# Patient Record
Sex: FEMALE | Race: BLACK OR AFRICAN AMERICAN | Employment: FULL TIME | URBAN - METROPOLITAN AREA
[De-identification: names, ages, dates, MRNs, and addresses within clinical notes are randomized per-mention and may not be internally consistent; named-entity substitution may affect disease eponyms.]

---

## 2017-05-12 ENCOUNTER — OFFICE VISIT (OUTPATIENT)
Dept: OBGYN CLINIC | Age: 32
End: 2017-05-12

## 2017-05-12 VITALS
DIASTOLIC BLOOD PRESSURE: 68 MMHG | SYSTOLIC BLOOD PRESSURE: 104 MMHG | HEIGHT: 62 IN | WEIGHT: 139.6 LBS | BODY MASS INDEX: 25.69 KG/M2 | RESPIRATION RATE: 18 BRPM

## 2017-05-12 DIAGNOSIS — Z97.5 IUD (INTRAUTERINE DEVICE) IN PLACE: ICD-10-CM

## 2017-05-12 DIAGNOSIS — N89.8 VAGINAL DISCHARGE: ICD-10-CM

## 2017-05-12 DIAGNOSIS — N89.8 VAGINAL ITCHING: ICD-10-CM

## 2017-05-12 DIAGNOSIS — Z01.411 ENCOUNTER FOR GYNECOLOGICAL EXAMINATION (GENERAL) (ROUTINE) WITH ABNORMAL FINDINGS: Primary | ICD-10-CM

## 2017-05-12 DIAGNOSIS — Z71.1 WORRIED WELL: ICD-10-CM

## 2017-05-12 LAB — WET MOUNT POCT, WMPOCT: NORMAL

## 2017-05-12 NOTE — PATIENT INSTRUCTIONS

## 2017-05-12 NOTE — PROGRESS NOTES
[facility]  [email or web address]  [phone]    Herminio Kawasaki, MD, [address] [facility]       Annual Gynecologic Exam:  WWE <40  Chief Complaint   Patient presents with    Well Woman         Deysi Dominguez is a 32 y.o.  [address] female who presents for an annual well woman exam.  Patient's last menstrual period was 2017 (within days). .    With regard to the Gardisil vaccine, she is older than the FDA approved age to receive it. She does report additional concerns today. Has concerns in regards to having recurrent BV and yeast infections since having the IUD. Patient states, \"My friends told me about some soaps you can use with the IUD to minimize infections and I want to try that for awhile before I decide to have it taken out completely. \" Expressed that last week she had some vaginal itching, discharge and some odor. Had some antibiotics at home, and took those. Reports also, using the Monistat one a day. Expressed that symptoms have improved within the past week. Has had 2-3 yeast infections/year. Has a few more BV/ year. Last week took 1 d monistat after taking leftover meds for BV. Usually is treated by [facility] for vaginal infections      Menstrual status:  Her periods are spotting. She does not report dysmenorrhea/painful menses. She does not report irregular bleeding. Sexual history and Contraception:  History   Sexual Activity    Sexual activity: Yes    Partners: Male    Birth control/ protection: IUD     She sometimes use condoms with sexual activity  She does not reports new sexual partner(s) in the last year. The patient does request STD testing. We recommended testing per CDC guidelines and at patient request.     Preventive Medicine History:  Her last annual GYN exam was about one year ago. Her most recent Pap smear result: normal was obtained in 2016.   Her most recent HR HPV screen was Negative obtained 1 year(s) ago.    She does not know have a history of ANTHONY 2, 3 or cervical cancer. Past Medical History:   Diagnosis Date    Abnormal Pap smear      1/15    Chlamydia     not since highschool 10th grade    Encounter for insertion of mirena IUD 2/23/15    removal in 5 years   Juan Pablo Souza Gonorrhea     last episode in highschool 10 th grade age 12 tx received    Pap smear for cervical cancer screening 16    Neg,HPV Neg    [facility]     OB History    Para Term  AB SAB TAB Ectopic Multiple Living   3 1  1 2 1 1 0 0 1      # Outcome Date GA Lbr Deny/2nd Weight Sex Delivery Anes PTL Lv   3 TAB            2             1 SAB                 History reviewed. No pertinent surgical history. Family History   Problem Relation Age of Onset    Headache Mother    Juan Pablo Souza Migraines Mother     Hypertension Mother     Arthritis-osteo Maternal Aunt     Alcohol abuse Maternal Grandfather     Cancer Paternal Grandmother     Breast Cancer Paternal Grandmother     Asthma Paternal Grandfather      Social History     Social History    Marital status: SINGLE     Spouse name: N/A    Number of children: N/A    Years of education: N/A     Occupational History    Not on file. Social History Main Topics    Smoking status: Never Smoker    Smokeless tobacco: Never Used    Alcohol use No    Drug use: No    Sexual activity: Yes     Partners: Male     Birth control/ protection: IUD     Other Topics Concern    Not on file     Social History Narrative       Allergies   Allergen Reactions    Latex, Natural Rubber Swelling       Current Outpatient Prescriptions   Medication Sig    levonorgestrel (MIRENA) 20 mcg/24 hr (5 years) IUD 1 Each by IntraUTERine route once. No current facility-administered medications for this visit. There is no problem list on file for this patient.       Review of Systems - History obtained from the patient  Constitutional: negative for weight loss, fever, night sweats  HEENT: negative for hearing loss, earache, congestion, snoring, sorethroat  CV: negative for chest pain, palpitations, edema  Resp: negative for cough, shortness of breath, wheezing  GI: negative for change in bowel habits, abdominal pain, black or bloody stools  : negative for frequency, dysuria, hematuria  GYN: see HPI  MSK: negative for back pain, joint pain, muscle pain  Breast: negative for breast lumps, nipple discharge, galactorrhea  Skin :negative for itching, rash, hives  Neuro: negative for dizziness, headache, confusion, weakness  Psych: negative for anxiety, depression, change in mood  Heme/lymph: negative for bleeding, bruising, pallor    Physical Exam  Visit Vitals    /68 (BP 1 Location: Left arm, BP Patient Position: Sitting)    Resp 18    Ht 5' 2\" (1.575 m)    Wt 139 lb 9.6 oz (63.3 kg)    LMP 05/12/2017 (Within Days)    BMI 25.53 kg/m2       Constitutional  · Appearance: well-nourished, well developed, alert, in no acute distress    HENT  · Head and Face: appears normal    Neck  · Inspection/Palpation: normal appearance, no masses or tenderness  · Lymph Nodes: no lymphadenopathy present  · Thyroid: gland size normal, nontender, no nodules or masses present on palpation    Chest  · Respiratory Effort: breathing labored  · Auscultation: normal breath sounds    Cardiovascular  · Heart:  · Auscultation: regular rate and rhythm without murmur    Breasts  · Inspection of Breasts: breasts symmetrical, no skin changes, no discharge present, nipple appearance normal, no skin retraction present  · Palpation of Breasts and Axillae: no masses present on palpation, no breast tenderness  · Axillary Lymph Nodes: no lymphadenopathy present    Gastrointestinal  · Abdominal Examination: abdomen non-tender to palpation, normal bowel sounds, no masses present  · Liver and spleen: no hepatomegaly present, spleen not palpable  · Hernias: no hernias identified    Genitourinary  · External Genitalia: normal appearance for age, no discharge present, no tenderness present, no inflammatory lesions present, no masses present  · Vagina: normal vaginal vault without central or paravaginal defects, blood tinged discharge present, no inflammatory lesions present, no masses present  · Bladder: non-tender to palpation  · Urethra: appears normal  · Cervix: normal   · IUD strings seen and appropriate length  ·   · Uterus: normal size, shape and consistency  · Adnexa: no adnexal tenderness present, no adnexal masses present  · Perineum: perineum within normal limits, no evidence of trauma, no rashes or skin lesions present  · Anus: anus within normal limits, no hemorrhoids present  · Inguinal Lymph Nodes: no lymphadenopathy present    Skin  · General Inspection: no rash, no lesions identified    Neurologic/Psychiatric  · Mental Status:  · Orientation: grossly oriented to person, place and time  · Mood and Affect: mood normal, affect appropriate    Assessment:  32 y.o.  for well woman exam  Encounter Diagnoses   Name Primary?  Vaginal discharge     Vaginal itching     Encounter for gynecological examination (general) (routine) with abnormal findings Yes    Worried well     IUD (intrauterine device) in place        Plan:  The patient was counseled about diet, exercise, healthy lifestyle  We discussed self breast exam  We discussed safer sex practices, condom use and risk factors for sexually transmitted diseases. We discussed current pap smear and HR HPV testing guidelines.    We recommend follow up one year for routine annual gynecologic exam or sooner prn  We recommend routine follow up with her primary care doctor for management of chronic medical problems and non-gynecologic concerns  Handouts were given to the patient  We discussed calcium/vitamin D/weight bearing exercise and osteoporosis prevention    Disc options for recurrent infections  Good vulvar hygiene  Obtain BV/yeast records if wants suppression  Pt wants to continue IUD    Folllow up:  [x] return for annual well woman exam in one year or sooner if she is having problems  [] follow up and ultrasound  [] 6 months  [] 3 months  [] 6 weeks   [] 1 month    Orders Placed This Encounter    HEP B SURFACE AG    RPR    HIV SCREEN, [address] [place] [address].  W/REFLEX CONFIRM    NUSWAB VAGINITIS PLUS    AMB POC WET PREP (AKA STAIN, INTERPRET, WET MOUNT)       Results for orders placed or performed in visit on 05/12/17   AMB POC SMEAR, STAIN & INTERPRET, WET MOUNT   Result Value Ref Range    Wet mount (POC)      Narrative    BRITTA    Hypae: negative  Buds: negative    Wet Prep:  Trich: negative  Clue cells: negative  Hyphae: negative  Buds: negative  WBC's: increased

## 2017-05-12 NOTE — MR AVS SNAPSHOT
Visit Information Date & Time Provider Department Dept. Phone Encounter #  
 5/12/2017  3:20 PM [facility] [id number] Upcoming Health Maintenance Date Due INFLUENZA AGE 9 TO ADULT 8/1/2017 PAP AKA CERVICAL CYTOLOGY 2/17/2019 Allergies as of 5/12/2017  Review Complete On: 5/12/2017 By: Keith Roberts Severity Noted Reaction Type Reactions Latex, Natural Rubber  05/12/2017    Swelling Current Immunizations  Reviewed on 8/25/2013 No immunizations on file. Not reviewed this visit Vitals BP Resp Height(growth percentile) Weight(growth percentile) LMP BMI  
 104/68 (BP 1 Location: Left arm, BP Patient Position: Sitting) 18 5' 2\" (1.575 m) 139 lb 9.6 oz (63.3 kg) 05/12/2017 (Within Days) 25.53 kg/m2 OB Status Smoking Status Chemically Induced Never Smoker BMI and BSA Data Body Mass Index Body Surface Area 25.53 kg/m 2 1.66 m 2 Preferred Pharmacy Pharmacy Name Phone [id number] [address] [facility] [phone] Your Updated Medication List  
  
   
This list is accurate as of: 5/12/17  3:41 PM.  Always use your most recent med list.  
  
  
  
  
 levonorgestrel 20 mcg/24 hr (5 years) IUD Commonly known as:  MIRENA  
1 Each by IntraUTERine route once. Patient Instructions Well Visit, Ages 25 to 48: Care Instructions Your Care Instructions Physical exams can help you stay healthy. Your doctor has checked your overall health and may have suggested ways to take good care of yourself. He or she also may have recommended tests. At home, you can help prevent illness with healthy eating, regular exercise, and other steps. Follow-up care is a key part of your treatment and safety.  Be sure to make and go to all appointments, and call your doctor if you are having problems. It's also a good idea to know your test results and keep a list of the medicines you take. How can you care for yourself at home? · Reach and stay at a healthy weight. This will lower your risk for many problems, such as obesity, diabetes, heart disease, and high blood pressure. · Get at least 30 minutes of physical activity on most days of the week. Walking is a good choice. You also may want to do other activities, such as running, swimming, cycling, or playing tennis or team sports. Discuss any changes in your exercise program with your doctor. · Do not smoke or allow others to smoke around you. If you need help quitting, talk to your doctor about stop-smoking programs and medicines. These can increase your chances of quitting for good. · Talk to your doctor about whether you have any risk factors for sexually transmitted infections (STIs). Having one sex partner (who does not have STIs and does not have sex with anyone else) is a good way to avoid these infections. · Use birth control if you do not want to have children at this time. Talk with your doctor about the choices available and what might be best for you. · Protect your skin from too much sun. When you're outdoors from 10 a.m. to 4 p.m., stay in the shade or cover up with clothing and a hat with a wide brim. Wear sunglasses that block UV rays. Even when it's cloudy, put broad-spectrum sunscreen (SPF 30 or higher) on any exposed skin. · See a dentist one or two times a year for checkups and to have your teeth cleaned. · Wear a seat belt in the car. · Drink alcohol in moderation, if at all. That means no more than 2 drinks a day for men and 1 drink a day for women. Follow your doctor's advice about when to have certain tests. These tests can spot problems early. For everyone · Cholesterol. Have the fat (cholesterol) in your blood tested after age 21.  Your doctor will tell you how often to have this done based on your age, family history, or other things that can increase your risk for heart disease. · Blood pressure. Have your blood pressure checked during a routine doctor visit. Your doctor will tell you how often to check your blood pressure based on your age, your blood pressure results, and other factors. · Vision. Talk with your doctor about how often to have a glaucoma test. 
· Diabetes. Ask your doctor whether you should have tests for diabetes. · Colon cancer. Have a test for colon cancer at age 48. You may have one of several tests. If you are younger than 48, you may need a test earlier if you have any risk factors. Risk factors include whether you already had a precancerous polyp removed from your colon or whether your parent, brother, sister, or child has had colon cancer. For women · Breast exam and mammogram. Talk to your doctor about when you should have a clinical breast exam and a mammogram. Medical experts differ on whether and how often women under 50 should have these tests. Your doctor can help you decide what is right for you. · Pap test and pelvic exam. Begin Pap tests at age 24. A Pap test is the best way to find cervical cancer. The test often is part of a pelvic exam. Ask how often to have this test. 
· Tests for sexually transmitted infections (STIs). Ask whether you should have tests for STIs. You may be at risk if you have sex with more than one person, especially if your partners do not wear condoms. For men · Tests for sexually transmitted infections (STIs). Ask whether you should have tests for STIs. You may be at risk if you have sex with more than one person, especially if you do not wear a condom. · Testicular cancer exam. Ask your doctor whether you should check your testicles regularly. · Prostate exam. Talk to your doctor about whether you should have a blood test (called a PSA test) for prostate cancer.  Experts differ on whether and when men should have this test. Some experts suggest it if you are older than 39 and are -American or have a father or brother who got prostate cancer when he was younger than 72. When should you call for help? Watch closely for changes in your health, and be sure to contact your doctor if you have any problems or symptoms that concern you. Where can you learn more? Go to http://john-kayla.info/. Enter P072 in the search box to learn more about \"Well Visit, Ages 25 to 48: Care Instructions. \" Current as of: July 19, 2016 Content Version: 11.2 © 2296-5185 [employer]. Care instructions adapted under license by [employer] (which disclaims liability or warranty for this information). If you have questions about a medical condition or this instruction, always ask your healthcare professional. [address] any warranty or liability for your use of this information. Introducing [facility]! Dear Tawnya Berger: 
Thank you for requesting a [employer] account. Our records indicate that you already have an active [employer] account. You can access your account anytime at [email or web address] Did you know that you can access your hospital and ER discharge instructions at any time in [employer]? You can also review all of your test results from your hospital stay or ER visit. Additional Information If you have questions, please visit the Frequently Asked Questions section of the [employer] website at [email or web address] Remember, [employer] is NOT to be used for urgent needs. For medical emergencies, dial 911. Now available from your iPhone and Android! Please provide this summary of care documentation to your next provider. Your primary care clinician is listed as NONE. If you have any questions after today's visit, please call [phone].

## 2017-05-13 LAB
HBV SURFACE AG SERPL QL IA: NEGATIVE
HIV 1+2 AB+HIV1 P24 AG SERPL QL IA: NON REACTIVE
RPR SER QL: NON REACTIVE

## 2017-05-15 NOTE — PROGRESS NOTES
The results are normal.   Please notify patient. Recommend f/u if still having symptoms/problems or has additional concerns.

## 2017-05-16 LAB
A VAGINAE DNA VAG QL NAA+PROBE: ABNORMAL SCORE
BVAB2 DNA VAG QL NAA+PROBE: ABNORMAL SCORE
C ALBICANS DNA VAG QL NAA+PROBE: NEGATIVE
C GLABRATA DNA VAG QL NAA+PROBE: NEGATIVE
C TRACH RRNA SPEC QL NAA+PROBE: NEGATIVE
MEGA1 DNA VAG QL NAA+PROBE: ABNORMAL SCORE
N GONORRHOEA RRNA SPEC QL NAA+PROBE: NEGATIVE
T VAGINALIS RRNA SPEC QL NAA+PROBE: NEGATIVE

## 2017-05-18 ENCOUNTER — TELEPHONE (OUTPATIENT)
Dept: OBGYN CLINIC | Age: 32
End: 2017-05-18

## 2017-05-18 NOTE — TELEPHONE ENCOUNTER
LMTCB    ----- Message from Charlene Alvarez MD sent at 5/17/2017  5:41 PM EDT -----  Abnormal, notify patient.   Consistent with imbalance but not clearly c/w BV: borderline results  Will treat b/c sx, or pt can observe if sx improved (discharge/odor)  Rx:   flagyl 500mg bid   x 7 days    May cause nausea, take with food  Avoid etoh during treatment and 1 day following  Notify MD if NI after 1 week    (If patient prefers vaginal tx't  0.75 %t metronidazole vaginal gel   5 grams qhs per vagina  x5 days)

## 2017-05-22 ENCOUNTER — TELEPHONE (OUTPATIENT)
Dept: OBGYN CLINIC | Age: 32
End: 2017-05-22

## 2017-05-22 RX ORDER — METRONIDAZOLE 7.5 MG/G
1 GEL VAGINAL DAILY
Qty: 1 TUBE | Refills: 0 | Status: SHIPPED | OUTPATIENT
Start: 2017-05-22 | End: 2017-05-27

## 2017-05-22 NOTE — TELEPHONE ENCOUNTER
Patient explained results of nuswab results and patient has elected to have the rx for metrogel sent to pharmacy of choice.

## 2017-05-22 NOTE — TELEPHONE ENCOUNTER
Notes Recorded by Jad Soria MD on 5/17/2017 at 5:41 PM  Abnormal, notify patient.   Consistent with imbalance but not clearly c/w BV: borderline results  Will treat b/c sx, or pt can observe if sx improved (discharge/odor)  Rx:   flagyl 500mg bid   x 7 days    May cause nausea, take with food  Avoid etoh during treatment and 1 day following  Notify MD if NI after 1 week    (If patient prefers vaginal tx't  0.75 %t metronidazole vaginal gel   5 grams qhs per vagina  x5 days)

## 2018-01-04 NOTE — PROGRESS NOTES
Abnormal, notify patient.   Consistent with imbalance but not clearly c/w BV: borderline results  Will treat b/c sx, or pt can observe if sx improved (discharge/odor)  Rx:   flagyl 500mg bid   x 7 days    May cause nausea, take with food  Avoid etoh during treatment and 1 day following  Notify MD if NI after 1 week    (If patient prefers vaginal tx't  0.75 %t metronidazole vaginal gel   5 grams qhs per vagina  x5 days) Yes

## 2018-02-26 ENCOUNTER — OFFICE VISIT (OUTPATIENT)
Dept: OBGYN CLINIC | Age: 33
End: 2018-02-26

## 2018-02-26 VITALS — BODY MASS INDEX: 23.96 KG/M2 | SYSTOLIC BLOOD PRESSURE: 110 MMHG | DIASTOLIC BLOOD PRESSURE: 70 MMHG | WEIGHT: 131 LBS

## 2018-02-26 DIAGNOSIS — N60.11 FIBROCYSTIC BREAST CHANGES, RIGHT: ICD-10-CM

## 2018-02-26 DIAGNOSIS — Z80.3 FH: BREAST CANCER: ICD-10-CM

## 2018-02-26 DIAGNOSIS — N64.4 BREAST PAIN, RIGHT: Primary | ICD-10-CM

## 2018-02-26 RX ORDER — HYDROCODONE BITARTRATE AND ACETAMINOPHEN 5; 325 MG/1; MG/1
TABLET ORAL
COMMUNITY
End: 2018-04-09 | Stop reason: ALTCHOICE

## 2018-02-26 RX ORDER — PENICILLIN V POTASSIUM 250 MG/1
TABLET, FILM COATED ORAL 4 TIMES DAILY
COMMUNITY
End: 2020-05-27

## 2018-02-26 NOTE — PATIENT INSTRUCTIONS
Fibrocystic Breast Changes: Care Instructions  Your Care Instructions  Fibrocystic breast changes cause many small lumps to form in your breast. Some areas of your breast may feel thicker or denser than other areas. Your breasts also may feel sore or tender. You may notice lumps in both breasts around the nipple and in the upper, outer part of the breasts, especially before your menstrual period. The lumps may come and go and change size in just a few days. Fibrocystic breast changes are normal and are not cancer. Treatment is not usually needed. If you have a hard, grainy lump, unusual pain, or nipple discharge, your doctor may order tests to look for a more serious problem. Talk to your doctor about the need for regular mammograms. Follow-up care is a key part of your treatment and safety. Be sure to make and go to all appointments, and call your doctor if you are having problems. It's also a good idea to know your test results and keep a list of the medicines you take. How can you care for yourself at home? · Take an over-the-counter pain medicine, such as acetaminophen (Tylenol), ibuprofen (Advil, Motrin), or naproxen (Aleve). Read and follow all instructions on the label. · Do not take two or more pain medicines at the same time unless the doctor told you to. Many pain medicines have acetaminophen, which is Tylenol. Too much acetaminophen (Tylenol) can be harmful. · Wear a supportive bra, such as a sports bra or jog bra. · You may want to limit caffeine. Some women say that cutting back on caffeine reduces breast tenderness. · A diet very low in fat (about 15% of daily diet) may reduce breast tenderness. Talk to your doctor about whether you should try a very low-fat diet. When should you call for help? Watch closely for changes in your health, and be sure to contact your doctor if:  · You do not get better as expected. · Your breast has changed.   · You have pain in your breast.  · You have a discharge from your nipple. · A breast lump changes or does not go away. Where can you learn more? Go to http://john-kayla.info/. Enter F300 in the search box to learn more about \"Fibrocystic Breast Changes: Care Instructions. \"  Current as of: October 13, 2016  Content Version: 11.4  © 6273-9434 [employer]. Care instructions adapted under license by [employer] (which disclaims liability or warranty for this information). If you have questions about a medical condition or this instruction, always ask your healthcare professional. [place] [address] any warranty or liability for your use of this information.

## 2018-02-26 NOTE — PROGRESS NOTES
[address] [facility]  [email or web address]    Monisha Hdez MD, FACOG       OB/GYN Problem visit    Chief Complaint:   Chief Complaint   Patient presents with    Breast Problem       History of Present Illness: This is a new problem being evaluated by this provider. The patient is a 28 y.o.  female who reports having right breast pain for 3 weeks. Denies nipple discharge. She reports the symptoms are is unchanged. Aggravating factors include cycle. Alleviating factors include none. Removed nipple piercing 1 year ago due to pain. Describes right breast pain as feeling engorged, right breast is warm to touch. Recent dental surgery - taking hydrocodone and penicillin. +nipple piercing: remove a few months ago, received 1 1/2 years ago. Just ended cycle. No increase in caffeine. No trauma. She reports no prior history of breast cancer, biopsy or abnormal mammograms. She is not breast feeding. She does not have other concerns. Last Mammogram Results:  No results found for this or any previous visit. LMP: No LMP recorded. Patient is not currently having periods (Reason: Chemically Induced). PFSH:  Past Medical History:   Diagnosis Date    Abnormal Pap smear      1/15    Chlamydia     not since [facility] 10th grade    Encounter for insertion of mirena IUD 2/23/15    removal in 5 years   [facility] Gonorrhea     last episode in [employer] [facility] 10 th grade age 12 tx received    Pap smear for cervical cancer screening 16    Neg,HPV Neg    Psychiatric problem     depression in [facility]     No past surgical history on file.   Family History   Problem Relation Age of Onset    Headache Mother    [facility] Migraines Mother     Hypertension Mother     Arthritis-osteo Maternal Aunt     Alcohol abuse Maternal Grandfather     Cancer Paternal Grandmother     Breast Cancer Paternal Grandmother     Asthma Paternal Grandfather      Social History   Substance Use Topics    Smoking status: Never Smoker    Smokeless tobacco: Never Used    Alcohol use No     Allergies   Allergen Reactions    Latex, Natural Rubber Swelling     Current Outpatient Prescriptions   Medication Sig    penicillin v potassium (VEETID) 250 mg tablet Take  by mouth four (4) times daily.  HYDROcodone-acetaminophen (NORCO) 5-325 mg per tablet Take  by mouth.  levonorgestrel (MIRENA) 20 mcg/24 hr (5 years) IUD 1 Each by IntraUTERine route once. No current facility-administered medications for this visit. Review of Systems:  History obtained from the patient  Constitutional: negative for fevers, chills and weight loss  ENT ROS: negative for - hearing change, oral lesions or visual changes  Respiratory: negative for cough, wheezing or dyspnea on exertion  Cardiovascular: negative for chest pain, irregular heart beats, exertional chest pressure/discomfort  Gastrointestinal: negative for dysphagia, nausea and vomiting  Genito-Urinary ROS: no dysuria, trouble voiding, or hematuria  Inteument/breast: see HPI  Musculoskeletal:negative for stiff joints, neck pain and muscle weakness  Endocrine ROS: negative for - breast changes, galactorrhea or temperature intolerance  Hematological and Lymphatic ROS: negative for - blood clots, bruising or swollen lymph nodes    Physical Exam:  Visit Vitals    /70    Wt 131 lb (59.4 kg)    BMI 23.96 kg/m2       GENERAL: alert, well appearing, and in no distress  HEAD: normocephalic, atraumatic. NECK:  supple, no significant adenopathy, no palpable masses  PULM: clear to auscultation, no wheezes, rales or rhonchi, symmetric air entry   COR: normal rate and regular rhythm, S1 and S2 normal   BACK: no cvat  ABDOMEN: soft, nontender, nondistended, no masses or organomegaly   BREAST:   Right breast appear normal, no suspicious masses; more prominent fibrocystic changes on right, no skin or nipple changes or axillary nodes.   Left breast appear normal, no suspicious masses, no skin or nipple changes or axillary nodes  NEURO: alert, oriented, normal speech  SKIN: no breast skin changes    Assessment:  Encounter Diagnoses   Name Primary?  Breast pain, right Yes    Fibrocystic breast changes, right     FH: breast cancer        Plan:  The patient is advised that she should contact the office if she does not note improvement or if symptoms recur. She should contact our office with any questions or concerns. She could keep her routine annual exam appointment. We reviewed self breast exams with the patient. We recommend follow up with PCP for non-gynecologic complaints and chronic medical problems.     Breast referral  Disc sbe  Disc breast cancer screening options  Disc options including NSAIDs and observation, pt prefers breast referral/addl evaluation b/c FH    Orders Placed This Encounter    REFERRAL TO BREAST SURGERY

## 2018-02-26 NOTE — MR AVS SNAPSHOT
[address] [facility] [address] [id number] Patient: Scott Posada MRN: [id number] :1985 Visit Information Date & Time Provider Department Dept. Phone Encounter #  
 2018 11:00 AM Adriel Castaneda MD Ladarius Rodriguez [phone] [id number] Upcoming Health Maintenance Date Due Influenza Age 5 to Adult 2017 PAP AKA CERVICAL CYTOLOGY 2019 Allergies as of 2018  Review Complete On: 2018 By: Adriel Castaneda MD  
  
 Severity Noted Reaction Type Reactions Latex, Natural Rubber  2017    Swelling Current Immunizations  Reviewed on 2013 No immunizations on file. Not reviewed this visit You Were Diagnosed With   
  
 Codes Comments Breast pain, right    -  Primary ICD-10-CM: N64.4 ICD-9-CM: 611.71 Fibrocystic breast changes, right     ICD-10-CM: N60.11 ICD-9-CM: 610.1 FH: breast cancer     ICD-10-CM: Z80.3 ICD-9-CM: V16.3 Vitals BP Weight(growth percentile) BMI OB Status Smoking Status 110/70 131 lb (59.4 kg) 23.96 kg/m2 Chemically Induced Never Smoker BMI and BSA Data Body Mass Index Body Surface Area  
 23.96 kg/m 2 1.61 m 2 Preferred Pharmacy Pharmacy Name Phone [id number] [place], [address] [phone] Your Updated Medication List  
  
   
This list is accurate as of 18 11:28 AM.  Always use your most recent med list.  
  
  
  
  
 HYDROcodone-acetaminophen 5-325 mg per tablet Commonly known as:  [place] Take  by mouth.  
  
 levonorgestrel 20 mcg/24 hr (5 years) Iud  
Commonly known as:  MIRENA  
1 Each by IntraUTERine route once. penicillin v potassium 250 mg tablet Commonly known as:  VEETID Take  by mouth four (4) times daily. Patient Instructions Fibrocystic Breast Changes: Care Instructions Your Care Instructions Fibrocystic breast changes cause many small lumps to form in your breast. Some areas of your breast may feel thicker or denser than other areas. Your breasts also may feel sore or tender. You may notice lumps in both breasts around the nipple and in the upper, outer part of the breasts, especially before your menstrual period. The lumps may come and go and change size in just a few days. Fibrocystic breast changes are normal and are not cancer. Treatment is not usually needed. If you have a hard, grainy lump, unusual pain, or nipple discharge, your doctor may order tests to look for a more serious problem. Talk to your doctor about the need for regular mammograms. Follow-up care is a key part of your treatment and safety. Be sure to make and go to all appointments, and call your doctor if you are having problems. It's also a good idea to know your test results and keep a list of the medicines you take. How can you care for yourself at home? · Take an over-the-counter pain medicine, such as acetaminophen (Tylenol), ibuprofen (Advil, Motrin), or naproxen (Aleve). Read and follow all instructions on the label. · Do not take two or more pain medicines at the same time unless the doctor told you to. Many pain medicines have acetaminophen, which is Tylenol. Too much acetaminophen (Tylenol) can be harmful. · Wear a supportive bra, such as a sports bra or jog bra. · You may want to limit caffeine. Some women say that cutting back on caffeine reduces breast tenderness. · A diet very low in fat (about 15% of daily diet) may reduce breast tenderness. Talk to your doctor about whether you should try a very low-fat diet. When should you call for help? Watch closely for changes in your health, and be sure to contact your doctor if: 
· You do not get better as expected. · Your breast has changed. · You have pain in your breast. 
· You have a discharge from your nipple. · A breast lump changes or does not go away. Where can you learn more? Go to http://john-kayla.info/. Enter C060 in the search box to learn more about \"Fibrocystic Breast Changes: Care Instructions. \" Current as of: October 13, 2016 Content Version: 11.4 © 0424-0478 [employer]. Care instructions adapted under license by [employer] (which disclaims liability or warranty for this information). If you have questions about a medical condition or this instruction, always ask your healthcare professional. [address] any warranty or liability for your use of this information. Introducing [facility]! Dear Corina Jaquez: 
Thank you for requesting a Proxim[employer] account. Our records indicate that you already have an active Proxim[employer] account. You can access your account anytime at [email or web address] Pay with a [email or web address] Did you know that you can access your hospital and ER discharge instructions at any time in Proximagen? You can also review all of your test results from your hospital stay or ER visit. Additional Information If you have questions, please visit the Frequently Asked Questions section of the Proximagen website at [email or web address] Pay with a [email or web address] Remember, Proximagen is NOT to be used for urgent needs. For medical emergencies, dial 911. Now available from your iPhone and Android! Please provide this summary of care documentation to your next provider. Your primary care clinician is listed as NONE. If you have any questions after today's visit, please call [phone].

## 2018-04-09 ENCOUNTER — OFFICE VISIT (OUTPATIENT)
Dept: SURGERY | Age: 33
End: 2018-04-09

## 2018-04-09 VITALS
SYSTOLIC BLOOD PRESSURE: 121 MMHG | WEIGHT: 130 LBS | HEIGHT: 62 IN | HEART RATE: 92 BPM | BODY MASS INDEX: 23.92 KG/M2 | DIASTOLIC BLOOD PRESSURE: 51 MMHG

## 2018-04-09 DIAGNOSIS — N64.4 MASTODYNIA OF RIGHT BREAST: ICD-10-CM

## 2018-04-09 DIAGNOSIS — N63.10 BREAST MASS, RIGHT: Primary | ICD-10-CM

## 2018-04-09 NOTE — PROGRESS NOTES
HISTORY OF PRESENT ILLNESS  Andreea Gonzalez is a 28 y.o. female. HPI NEW Patient presents for consultation at the request of Dr. Blair Crowder for RIGHT breast \"pressure\" and lump. Describes pressure as \"on and off\" since February. Went to see Dayne Crowder who palpated \"pea-sized lump. \"     FH includes paternal grandmother who was diagnosed with breast cancer in her 30-40's. Still living. The patient has never had breast imaging done before. Past Medical History:   Diagnosis Date    Abnormal Pap smear      1/15    Chlamydia     not since highschool 10th grade    Encounter for insertion of mirena IUD 2/23/15    removal in 5 years   [address] [place] [address] Gonorrhea     last episode in [facility] 10 th grade age 12 tx received    Pap smear for cervical cancer screening 16    Neg,HPV Neg    [facility]     History reviewed. No pertinent surgical history. Social History     Social History    Marital status: SINGLE     Spouse name: N/A    Number of children: N/A    Years of education: N/A     Occupational History    Not on file. Social History Main Topics    Smoking status: Former Smoker    Smokeless tobacco: Never Used    Alcohol use 0.6 oz/week     1 Standard drinks or equivalent per week    Drug use: No    Sexual activity: Yes     Partners: Male     Birth control/ protection: IUD     Other Topics Concern    Not on file     Social History Narrative     OB History      Para Term  AB Living    3 1  1 2 1    SAB TAB Ectopic Molar Multiple Live Births    1 1 0  0         Obstetric Comments    Menarche:  8. LMP: 3/26/18. # of Children:  2. Age at Delivery of First Child:  21.   Hysterectomy/oophorectomy:  NO/NO. Breast Bx:  no.  Hx of Breast Feeding:  yes. BCP:  yes.  Hormone therapy:  no.               Current Outpatient Prescriptions:     levonorgestrel (MIRENA) 20 mcg/24 hr (5 years) IUD, 1 Each by IntraUTERine route once., Disp: , Rfl:    penicillin v potassium (VEETID) 250 mg tablet, Take  by mouth four (4) times daily. , Disp: , Rfl:   Allergies   Allergen Reactions    Latex, Natural Rubber Swelling         Review of Systems   Constitutional: Negative. HENT: Negative. Eyes: Negative. Respiratory: Negative. Cardiovascular: Negative. Gastrointestinal: Negative. Genitourinary: Negative. Musculoskeletal: Negative. Skin: Negative. Neurological: Negative. Endo/Heme/Allergies: Negative. Psychiatric/Behavioral: Negative. Physical Exam   Constitutional: She is oriented to person, place, and time. She appears well-developed and well-nourished. HENT:   Head: Normocephalic. Eyes: EOM are normal.   Neck: Neck supple. Cardiovascular: Intact distal pulses. Pulmonary/Chest: Effort normal.   Bilateral dense breast tissue at 12:00 bilateral breasts, right greater than left. Abdominal: Soft. Musculoskeletal: Normal range of motion. Neurological: She is alert and oriented to person, place, and time. Skin: Skin is warm and dry. Psychiatric: She has a normal mood and affect. Nursing note and vitals reviewed. ASSESSMENT and PLAN    ICD-10-CM ICD-9-CM    1. Breast mass, right N63.10 611.72    2. Mastodynia of right breast N64.4 611.71      - will order dx mammograms and right us. - will know plan once we have imaging.

## 2018-04-09 NOTE — PATIENT INSTRUCTIONS
Breast Self-Exam: Care Instructions  Your Care Instructions    A breast self-exam is when you check your breasts for lumps or changes. This regular exam helps you learn how your breasts normally look and feel. Most breast problems or changes are not because of cancer. Breast self-exam is not a substitute for a mammogram. Having regular breast exams by your doctor and regular mammograms improve your chances of finding any problems with your breasts. Some women set a time each month to do a step-by-step breast self-exam. Other women like a less formal system. They might look at their breasts as they brush their teeth, or feel their breasts once in a while in the shower. If you notice a change in your breast, tell your doctor. Follow-up care is a key part of your treatment and safety. Be sure to make and go to all appointments, and call your doctor if you are having problems. It's also a good idea to know your test results and keep a list of the medicines you take. How do you do a breast self-exam?  · The best time to examine your breasts is usually one week after your menstrual period begins. Your breasts should not be tender then. If you do not have periods, you might do your exam on a day of the month that is easy to remember. · To examine your breasts:  ¨ Remove all your clothes above the waist and lie down. When you are lying down, your breast tissue spreads evenly over your chest wall, which makes it easier to feel all your breast tissue. ¨ Use the pads-not the fingertips-of the 3 middle fingers of your left hand to check your right breast. Move your fingers slowly in small coin-sized circles that overlap. ¨ Use three levels of pressure to feel of all your breast tissue. Use light pressure to feel the tissue close to the skin surface. Use medium pressure to feel a little deeper. Use firm pressure to feel your tissue close to your breastbone and ribs.  Use each pressure level to feel your breast tissue before moving on to the next spot. ¨ Check your entire breast, moving up and down as if following a strip from the collarbone to the bra line, and from the armpit to the ribs. Repeat until you have covered the entire breast.  ¨ Repeat this procedure for your left breast, using the pads of the 3 middle fingers of your right hand. · To examine your breasts while in the shower:  ¨ Place one arm over your head and lightly soap your breast on that side. ¨ Using the pads of your fingers, gently move your hand over your breast (in the strip pattern described above), feeling carefully for any lumps or changes. ¨ Repeat for the other breast.  · Have your doctor inspect anything you notice to see if you need further testing. Where can you learn more? Go to http://john-kayla.info/. Enter P148 in the search box to learn more about \"Breast Self-Exam: Care Instructions. \"  Current as of: May 12, 2017  Content Version: 11.4  © 7728-8789 [employer]. Care instructions adapted under license by [employer] (which disclaims liability or warranty for this information). If you have questions about a medical condition or this instruction, always ask your healthcare professional. [place] [address] any warranty or liability for your use of this information.

## 2018-04-10 PROBLEM — N63.10 BREAST MASS, RIGHT: Status: ACTIVE | Noted: 2018-04-10

## 2018-04-10 PROBLEM — N64.4 MASTODYNIA OF RIGHT BREAST: Status: ACTIVE | Noted: 2018-04-10

## 2018-04-18 ENCOUNTER — HOSPITAL ENCOUNTER (OUTPATIENT)
Dept: MAMMOGRAPHY | Age: 33
Discharge: HOME | End: 2018-04-18
Attending: SURGERY
Payer: COMMERCIAL

## 2018-04-18 DIAGNOSIS — N63.0 BREAST LUMP: ICD-10-CM

## 2018-04-18 PROCEDURE — 76642 ULTRASOUND BREAST LIMITED: CPT

## 2018-04-18 PROCEDURE — 77066 DX MAMMO INCL CAD BI: CPT

## 2018-08-01 ENCOUNTER — OFFICE VISIT (OUTPATIENT)
Dept: OBGYN CLINIC | Age: 33
End: 2018-08-01

## 2018-08-01 VITALS
WEIGHT: 134 LBS | SYSTOLIC BLOOD PRESSURE: 118 MMHG | DIASTOLIC BLOOD PRESSURE: 72 MMHG | HEIGHT: 62 IN | BODY MASS INDEX: 24.66 KG/M2

## 2018-08-01 DIAGNOSIS — N89.8 VAGINAL DISCHARGE: ICD-10-CM

## 2018-08-01 DIAGNOSIS — N89.8 VAGINAL ODOR: ICD-10-CM

## 2018-08-01 DIAGNOSIS — Z97.5 IUD (INTRAUTERINE DEVICE) IN PLACE: ICD-10-CM

## 2018-08-01 DIAGNOSIS — Z71.1 WORRIED WELL: ICD-10-CM

## 2018-08-01 DIAGNOSIS — Z01.411 ENCOUNTER FOR GYNECOLOGICAL EXAMINATION (GENERAL) (ROUTINE) WITH ABNORMAL FINDINGS: Primary | ICD-10-CM

## 2018-08-01 LAB — WET MOUNT POCT, WMPOCT: NORMAL

## 2018-08-01 NOTE — MR AVS SNAPSHOT
[address] [facility] [address] [facility] 
[phone] Patient: Lulla Lennox MRN: [id number] :[id number] Visit Information Date & Time Provider Department Dept. Phone Encounter #  
 2018  4:00 PM Lupillo Underwood MD Ladarius Rodriguez [phone] [id number] Upcoming Health Maintenance Date Due Influenza Age 5 to Adult 2018 PAP AKA CERVICAL CYTOLOGY 2019 Allergies as of 2018  Review Complete On: 2018 By: Miguelina Escobar LPN Severity Noted Reaction Type Reactions Latex, Natural Rubber  2017    Swelling Current Immunizations  Reviewed on 2013 No immunizations on file. Not reviewed this visit Vitals BP Height(growth percentile) Weight(growth percentile) LMP BMI OB Status 118/72 5' 2\" (1.575 m) 134 lb (60.8 kg) 2018 24.51 kg/m2 Having regular periods Smoking Status Former Smoker BMI and BSA Data Body Mass Index Body Surface Area 24.51 kg/m 2 1.63 m 2 Preferred Pharmacy Pharmacy Name Phone [id number], [address] [facility] [phone] Your Updated Medication List  
  
   
This list is accurate as of 18  4:03 PM.  Always use your most recent med list.  
  
  
  
  
 levonorgestrel 20 mcg/24 hr (5 years) Iud  
Commonly known as:  MIRENA  
1 Each by IntraUTERine route once. penicillin v potassium 250 mg tablet Commonly known as:  VEETID Take  by mouth four (4) times daily. Patient Instructions Well Visit, Ages 25 to 48: Care Instructions Your Care Instructions Physical exams can help you stay healthy. Your doctor has checked your overall health and may have suggested ways to take good care of yourself. He or she also may have recommended tests.  At home, you can help prevent illness with healthy eating, regular exercise, and other steps. Follow-up care is a key part of your treatment and safety. Be sure to make and go to all appointments, and call your doctor if you are having problems. It's also a good idea to know your test results and keep a list of the medicines you take. How can you care for yourself at home? · Reach and stay at a healthy weight. This will lower your risk for many problems, such as obesity, diabetes, heart disease, and high blood pressure. · Get at least 30 minutes of physical activity on most days of the week. Walking is a good choice. You also may want to do other activities, such as running, swimming, cycling, or playing tennis or team sports. Discuss any changes in your exercise program with your doctor. · Do not smoke or allow others to smoke around you. If you need help quitting, talk to your doctor about stop-smoking programs and medicines. These can increase your chances of quitting for good. · Talk to your doctor about whether you have any risk factors for sexually transmitted infections (STIs). Having one sex partner (who does not have STIs and does not have sex with anyone else) is a good way to avoid these infections. · Use birth control if you do not want to have children at this time. Talk with your doctor about the choices available and what might be best for you. · Protect your skin from too much sun. When you're outdoors from 10 a.m. to 4 p.m., stay in the shade or cover up with clothing and a hat with a wide brim. Wear sunglasses that block UV rays. Even when it's cloudy, put broad-spectrum sunscreen (SPF 30 or higher) on any exposed skin. · See a dentist one or two times a year for checkups and to have your teeth cleaned. · Wear a seat belt in the car. · Drink alcohol in moderation, if at all. That means no more than 2 drinks a day for men and 1 drink a day for women. Follow your doctor's advice about when to have certain tests. These tests can spot problems early. For everyone · Cholesterol. Have the fat (cholesterol) in your blood tested after age 21. Your doctor will tell you how often to have this done based on your age, family history, or other things that can increase your risk for heart disease. · Blood pressure. Have your blood pressure checked during a routine doctor visit. Your doctor will tell you how often to check your blood pressure based on your age, your blood pressure results, and other factors. · Vision. Talk with your doctor about how often to have a glaucoma test. 
· Diabetes. Ask your doctor whether you should have tests for diabetes. · Colon cancer. Have a test for colon cancer at age 48. You may have one of several tests. If you are younger than 48, you may need a test earlier if you have any risk factors. Risk factors include whether you already had a precancerous polyp removed from your colon or whether your parent, brother, sister, or child has had colon cancer. For women · Breast exam and mammogram. Talk to your doctor about when you should have a clinical breast exam and a mammogram. Medical experts differ on whether and how often women under 50 should have these tests. Your doctor can help you decide what is right for you. · Pap test and pelvic exam. Begin Pap tests at age 24. A Pap test is the best way to find cervical cancer. The test often is part of a pelvic exam. Ask how often to have this test. 
· Tests for sexually transmitted infections (STIs). Ask whether you should have tests for STIs. You may be at risk if you have sex with more than one person, especially if your partners do not wear condoms. For men · Tests for sexually transmitted infections (STIs). Ask whether you should have tests for STIs. You may be at risk if you have sex with more than one person, especially if you do not wear a condom. · Testicular cancer exam. Ask your doctor whether you should check your testicles regularly. · Prostate exam. Talk to your doctor about whether you should have a blood test (called a PSA test) for prostate cancer. Experts differ on whether and when men should have this test. Some experts suggest it if you are older than 39 and are -American or have a father or brother who got prostate cancer when he was younger than 72. When should you call for help? Watch closely for changes in your health, and be sure to contact your doctor if you have any problems or symptoms that concern you. Where can you learn more? Go to http://john-kayla.info/. Enter P072 in the search box to learn more about \"Well Visit, Ages 25 to 48: Care Instructions. \" Current as of: May 16, 2017 Content Version: 11.7 © 0494-1745 [employer]. Care instructions adapted under license by [employer] (which disclaims liability or warranty for this information). If you have questions about a medical condition or this instruction, always ask your healthcare professional. [place] [address] any warranty or liability for your use of this information. Introducing \[facility]\"" & HEALTH SERVICES! [employer] [place]: 
Thank you for requesting a [employer] account. Our records indicate that you already have an active [employer] account. You can access your account anytime at [email or web address] Did you know that you can access your hospital and ER discharge instructions at any time in [employer]? You can also review all of your test results from your hospital stay or ER visit. Additional Information If you have questions, please visit the Frequently Asked Questions section of the [employer] website at [email or web address] Remember, [employer] is NOT to be used for urgent needs. For medical emergencies, dial 911. Now available from your iPhone and Android! Please provide this summary of care documentation to your next provider. Your primary care clinician is listed as NONE. If you have any questions after today's visit, please call [phone].

## 2018-08-01 NOTE — PATIENT INSTRUCTIONS

## 2018-08-01 NOTE — PROGRESS NOTES
[address] [facility] 
[email or web address] 
[phone] Lexy Ag MD, Isidro Wade Annual Gynecologic Exam: 
WWE <40 Chief Complaint Patient presents with  Well Woman  Vaginal Discharge  Other  
  vag odor Dez Valdes is a 28 y.o.  [address] female who presents for an annual well woman exam. 
Patient's last menstrual period was 2018. Caity Jewel With regard to the Gardisil vaccine, she is older than the FDA approved age to receive it. She does report additional concerns today. Pt would like to discuss possibly getting her IUD removed due to 2 yeast infections in the past 3 months. Used 1d monistat and sx returned. Sx better but then had menses. +musty smell x 1-2 days. Menstrual status: 
Her periods are moderate. She does not report dysmenorrhea/painful menses. She does not report irregular bleeding. Sexual history and Contraception: 
History Sexual Activity  Sexual activity: Yes  Partners: Male  Birth control/ protection: IUD She never use condoms with sexual activity She does not reports new sexual partner(s) in the last year. The patient does not request STD testing. We recommended testing per CDC guidelines and at patient request.  
 
Preventive Medicine History: 
Her most recent Pap smear result: normal was obtained in 2016 Her most recent HR HPV screen was Negative obtained in 2016 She does not have a history of ANTHONY 2, 3 or cervical cancer. Past Medical History:  
Diagnosis Date  Abnormal Pap smear   1/15  Chlamydia   
 not since [facility] 10th grade  Encounter for insertion of mirena IUD 2/23/15  
 removal in 5 years  Gonorrhea   
 last episode in [facility] 8 th grade age 12 tx received  Pap smear for cervical cancer screening 16 Neg,HPV Neg  
 Psychiatric problem   
 depression in [facility] OB History  Para Term  AB Living 3 1  1 2 1 SAB TAB Ectopic Molar Multiple Live Births 1 1 0  0 # Outcome Date GA Lbr Deny/2nd Weight Sex Delivery Anes PTL Lv  
3 TAB           
2  1 SAB Obstetric Comments Menarche:  10. LMP: 3/26/18. # of Children:  2. Age at Delivery of First Child:  21.   Hysterectomy/oophorectomy:  NO/NO. Breast Bx:  no.  Hx of Breast Feeding:  yes. BCP:  yes. Hormone therapy:  no.   
  
 
No past surgical history on file. Family History Problem Relation Age of Onset  Headache Mother  Migraines Mother  Hypertension Mother  Arthritis-osteo Maternal Aunt  Alcohol abuse Maternal Grandfather  Cancer Paternal Grandmother  Breast Cancer Paternal Grandmother 30-40  Asthma Paternal Grandfather Social History Social History  Marital status: SINGLE Spouse name: N/A  
 Number of children: N/A  
 Years of education: N/A Occupational History  Not on file. Social History Main Topics  Smoking status: Former Smoker  Smokeless tobacco: Never Used  Alcohol use 0.6 oz/week 1 Standard drinks or equivalent per week  Drug use: No  
 Sexual activity: Yes  
  Partners: Male Birth control/ protection: IUD Other Topics Concern  Not on file Social History Narrative Allergies Allergen Reactions  Latex, Natural Rubber Swelling Current Outpatient Prescriptions Medication Sig  levonorgestrel (MIRENA) 20 mcg/24 hr (5 years) IUD 1 Each by IntraUTERine route once.  penicillin v potassium (VEETID) 250 mg tablet Take  by mouth four (4) times daily. No current facility-administered medications for this visit. Patient Active Problem List  
Diagnosis Code  Breast mass, right N63.10  Mastodynia of right breast N64.4 Review of Systems - History obtained from the patient Constitutional: negative for weight loss, fever, night sweats HEENT: negative for hearing loss, earache, congestion, snoring, sorethroat CV: negative for chest pain, palpitations, edema Resp: negative for cough, shortness of breath, wheezing GI: negative for change in bowel habits, abdominal pain, black or bloody stools : negative for frequency, dysuria, hematuria GYN: see HPI 
MSK: negative for back pain, joint pain, muscle pain Breast: negative for breast lumps, nipple discharge, galactorrhea Skin :negative for itching, rash, hives Neuro: negative for dizziness, headache, confusion, weakness Psych: negative for anxiety, depression, change in mood Heme/lymph: negative for bleeding, bruising, pallor Physical Exam 
Visit Vitals  /72  Ht 5' 2\" (1.575 m)  Wt 134 lb (60.8 kg)  LMP 07/28/2018  BMI 24.51 kg/m2 Constitutional 
· Appearance: well-nourished, well developed, alert, in no acute distress HENT 
· Head and Face: appears normal 
 
Neck · Inspection/Palpation: normal appearance, no masses or tenderness · Lymph Nodes: no lymphadenopathy present · Thyroid: gland size normal, nontender, no nodules or masses present on palpation Chest 
· Respiratory Effort: breathing unlabored · Auscultation: normal breath sounds Cardiovascular · Heart: 
· Auscultation: regular rate and rhythm without murmur Breasts · Inspection of Breasts: breasts symmetrical, no skin changes, no discharge present, nipple appearance normal, no skin retraction present · Palpation of Breasts and Axillae: no masses present on palpation, no breast tenderness · Axillary Lymph Nodes: no lymphadenopathy present Gastrointestinal 
· Abdominal Examination: abdomen non-tender to palpation, normal bowel sounds, no masses present · Liver and spleen: no hepatomegaly present, spleen not palpable · Hernias: no hernias identified Genitourinary · External Genitalia: normal appearance for age, no discharge present, no tenderness present, no inflammatory lesions present, no masses present · Vagina: normal vaginal vault without central or paravaginal defects, blood tinged discharge present, no inflammatory lesions present, no masses present · Bladder: non-tender to palpation · Urethra: appears normal 
· Cervix: normal  
IUD strings seen and short length at cervical ext os · Uterus: normal size, shape and consistency · Adnexa: no adnexal tenderness present, no adnexal masses present · Perineum: perineum within normal limits, no evidence of trauma, no rashes or skin lesions present · Anus: anus within normal limits, no hemorrhoids present · Inguinal Lymph Nodes: no lymphadenopathy present Skin · General Inspection: no rash, no lesions identified Neurologic/Psychiatric · Mental Status: · Orientation: grossly oriented to person, place and time · Mood and Affect: mood normal, affect appropriate Assessment: 
28 y.o.  for well woman exam 
Encounter Diagnoses Name Primary?  Vaginal discharge  Vaginal odor  Encounter for gynecological examination (general) (routine) with abnormal findings Yes  Worried well  IUD (intrauterine device) in place Plan: The patient was counseled about diet, exercise, healthy lifestyle We discussed self breast exam 
We discussed safer sex practices, condom use and risk factors for sexually transmitted diseases. We discussed current pap smear and HR HPV testing guidelines. We recommend follow up one year for routine annual gynecologic exam or sooner prn We recommend routine follow up with her primary care doctor for management of chronic medical problems and non-gynecologic concerns Handouts were given to the patient We discussed calcium/vitamin D/weight bearing exercise and osteoporosis prevention Discussed safer sex practices, condom use and risk factors for sexually transmitted diseases. Std screening per pt req We discussed potential causes of vaginal discharge/irritation.   
We discussed good vulvar hygiene. Recommended avoid vaginal irritants. Discussed use of mild soaps/detergents. Follow up if NI. We reviewed wet prep findings with the patient at her visit. Patient will be notified about swab results and prescription sent, if indicated. Folllow up: 
[x] return for annual well woman exam in one year or sooner if she is having problems 
[] follow up and ultrasound [] 6 months 
[] 3 months 
[] 6 weeks [] 1 month Orders Placed This Encounter  HEP B SURFACE AG  
 RPR  
 HIV SCREEN, 4TH GEN. W/REFLEX CONFIRM  
 NUSWAB VAGINITIS PLUS  
 AMB POC WET PREP (AKA STAIN, INTERPRET, WET MOUNT) Results for orders placed or performed in visit on 08/01/18 AMB POC SMEAR, STAIN & INTERPRET, WET MOUNT Result Value Ref Range Wet mount (POC) Narrative KOH Hypae: negative Buds: negative Wet Prep: 
Trich: negative Clue cells: negative Hyphae: negative Buds: negative WBC's: normal

## 2018-08-01 NOTE — PROGRESS NOTES
[address] [facility] 
[email or web address] 
[phone] Rylee Mayfield MD, FACOG  
 
 
OB/GYN: OB Problem visit Chief Complaint:  
Chief Complaint Patient presents with  Well Woman Patient Active Problem List  
 Diagnosis  Breast mass, right  Mastodynia of right breast  
 
 
History of Present Illness: The patient is a 28 y.o.  female who reports having *** for {NUMBERS:51774} {days/wks/mos/yrs:154652}. This {IS/IS NOT:11093:s} a new problem. This {IS/IS NOT:91936} a routinely scheduled OB appointment. She reports the symptoms are {BETTER/WORSE:11904}. Aggravating factors include {None:90035::\"none\"}. Alleviating factors include {None:11022::\"none\"}. She {DOES_DOES GZE:54777} have other concerns. PFSH: 
Past Medical History:  
Diagnosis Date  Abnormal Pap smear   1/15  Chlamydia   
 not since [facility] 10th grade  Encounter for insertion of mirena IUD 2/23/15  
 removal in 5 years  Gonorrhea   
 last episode in [facility] 8 th grade age 12 tx received  Pap smear for cervical cancer screening 16 Neg,HPV Neg  
 Psychiatric problem   
 depression in [facility] No past surgical history on file. Family History Problem Relation Age of Onset  Headache Mother  Migraines Mother  Hypertension Mother  Arthritis-osteo Maternal Aunt  Alcohol abuse Maternal Grandfather  Cancer Paternal Grandmother  Breast Cancer Paternal Grandmother 30-40  Asthma Paternal Grandfather Social History Substance Use Topics  Smoking status: Former Smoker  Smokeless tobacco: Never Used  Alcohol use 0.6 oz/week 1 Standard drinks or equivalent per week Allergies Allergen Reactions  Latex, Natural Rubber Swelling Current Outpatient Prescriptions Medication Sig  levonorgestrel (MIRENA) 20 mcg/24 hr (5 years) IUD 1 Each by IntraUTERine route once.   
 penicillin v potassium (VEETID) 250 mg tablet Take  by mouth four (4) times daily. No current facility-administered medications for this visit. Review of Systems: 
History obtained from the patient and written ROS questionnaire Constitutional: negative for fevers, chills and weight loss ENT ROS: negative for - hearing change, oral lesions or visual changes Respiratory: negative for cough, wheezing or dyspnea on exertion Cardiovascular: negative for chest pain, irregular heart beats, exertional chest pressure/discomfort Gastrointestinal: negative for dysphagia, nausea and vomiting Genito-Urinary ROS: {rosgu:343880}, see HPI Inteument/breast: negative for rash, breast lump and nipple discharge Musculoskeletal:negative for stiff joints, neck pain and muscle weakness Endocrine ROS: negative for - breast changes, galactorrhea or temperature intolerance Hematological and Lymphatic ROS: negative for - blood clots, bruising or swollen lymph nodes Physical Exam: 
Visit Vitals  /72  Ht 5' 2\" (1.575 m)  Wt 134 lb (60.8 kg)  BMI 24.51 kg/m2 GENERAL: alert, well appearing, and in no distress HEAD; normocephalic, atraumatic PULM: clear to auscultation, no wheezes, rales or rhonchi, symmetric air entry COR: normal rate and regular rhythm, S1 and S2 normal  
ABDOMEN: soft, nontender, nondistended, no masses or organomegaly BACK: normal range of motion, no tenderness, no CVAT  
EGBUS: no lesions, no inflammation, no masses VULVA: normal appearing vulva with no masses, tenderness or lesions VAGINA: normal appearing vagina with normal color, no lesions, {vaginal discharge:26867:s} discharge CERVIX: normal appearing cervix without discharge or lesions, non tender UTERUS: uterus is enlarged in size, gravid appropriate for gestational age ADNEXA: normal adnexa in size, nontender and no masses NEURO: alert, oriented, normal speech See PN flowsheet for additional notes and exam 
 
Assessment: 
28 y.o.  Unknown No diagnosis found. Plan: An evaluation of this patient's concern is planned.  
The patient is advised that she should contact the office if she does not note improvement or if s

## 2018-08-14 ENCOUNTER — TELEPHONE (OUTPATIENT)
Dept: OBGYN CLINIC | Age: 33
End: 2018-08-14

## 2018-08-14 RX ORDER — METRONIDAZOLE 7.5 MG/G
1 GEL VAGINAL
Qty: 187.5 MG | Refills: 0 | Status: SHIPPED | OUTPATIENT
Start: 2018-08-14 | End: 2018-08-19

## 2018-08-14 NOTE — TELEPHONE ENCOUNTER
Pt notified of results and MD recommendations. Pt verbalized understanding and stated she is having sx and would like the prescription sent in.     Prescription sent to pr's preferred pharmacy per MD order. ----- Message from Justa Hobbs MD sent at 8/13/2018  9:35 PM EDT -----  Notify pt.   Borderline for  BV, we can treat if still having sx (odor/thin discharge)  Notify MD if NI after 1 week  Rx: (pt usually prefers vag txt)  0.75 % metronidazole vaginal gel   5 grams qhs per vagina  x5 days

## 2018-08-14 NOTE — PROGRESS NOTES
Notify pt.   Borderline for  BV, we can treat if still having sx (odor/thin discharge)  Notify MD if NI after 1 week  Rx: (pt usually prefers vag txt)  0.75 % metronidazole vaginal gel   5 grams qhs per vagina  x5 days

## 2019-04-16 ENCOUNTER — TELEPHONE (OUTPATIENT)
Dept: OBGYN CLINIC | Age: 34
End: 2019-04-16

## 2019-04-16 NOTE — TELEPHONE ENCOUNTER
Patient calling wanting to know the date of when she had her IUD inserted and when it is due to have it removed. Patient advised that it was inserted 02/23/2015 and due to be replaced 2/23/2020    Patient verbalized understanding.

## 2019-09-09 NOTE — PATIENT INSTRUCTIONS
Well Visit, Ages 25 to 48: Care Instructions  Your Care Instructions    Physical exams can help you stay healthy. Your doctor has checked your overall health and may have suggested ways to take good care of yourself. He or she also may have recommended tests. At home, you can help prevent illness with healthy eating, regular exercise, and other steps. Follow-up care is a key part of your treatment and safety. Be sure to make and go to all appointments, and call your doctor if you are having problems. It's also a good idea to know your test results and keep a list of the medicines you take. How can you care for yourself at home? · Reach and stay at a healthy weight. This will lower your risk for many problems, such as obesity, diabetes, heart disease, and high blood pressure. · Get at least 30 minutes of physical activity on most days of the week. Walking is a good choice. You also may want to do other activities, such as running, swimming, cycling, or playing tennis or team sports. Discuss any changes in your exercise program with your doctor. · Do not smoke or allow others to smoke around you. If you need help quitting, talk to your doctor about stop-smoking programs and medicines. These can increase your chances of quitting for good. · Talk to your doctor about whether you have any risk factors for sexually transmitted infections (STIs). Having one sex partner (who does not have STIs and does not have sex with anyone else) is a good way to avoid these infections. · Use birth control if you do not want to have children at this time. Talk with your doctor about the choices available and what might be best for you. · Protect your skin from too much sun. When you're outdoors from 10 a.m. to 4 p.m., stay in the shade or cover up with clothing and a hat with a wide brim. Wear sunglasses that block UV rays. Even when it's cloudy, put broad-spectrum sunscreen (SPF 30 or higher) on any exposed skin.   · See a dentist one or two times a year for checkups and to have your teeth cleaned. · Wear a seat belt in the car. Follow your doctor's advice about when to have certain tests. These tests can spot problems early. For everyone  · Cholesterol. Have the fat (cholesterol) in your blood tested after age 21. Your doctor will tell you how often to have this done based on your age, family history, or other things that can increase your risk for heart disease. · Blood pressure. Have your blood pressure checked during a routine doctor visit. Your doctor will tell you how often to check your blood pressure based on your age, your blood pressure results, and other factors. · Vision. Talk with your doctor about how often to have a glaucoma test.  · Diabetes. Ask your doctor whether you should have tests for diabetes. · Colon cancer. Your risk for colorectal cancer gets higher as you get older. Some experts say that adults should start regular screening at age 48 and stop at age 76. Others say to start before age 48 or continue after age 76. Talk with your doctor about your risk and when to start and stop screening. For women  · Breast exam and mammogram. Talk to your doctor about when you should have a clinical breast exam and a mammogram. Medical experts differ on whether and how often women under 50 should have these tests. Your doctor can help you decide what is right for you. · Cervical cancer screening test and pelvic exam. Begin with a Pap test at age 24. The test often is part of a pelvic exam. Starting at age 27, you may choose to have a Pap test, an HPV test, or both tests at the same time (called co-testing). Talk with your doctor about how often to have testing. · Tests for sexually transmitted infections (STIs). Ask whether you should have tests for STIs. You may be at risk if you have sex with more than one person, especially if your partners do not wear condoms.   For men  · Tests for sexually transmitted infections (STIs). Ask whether you should have tests for STIs. You may be at risk if you have sex with more than one person, especially if you do not wear a condom. · Testicular cancer exam. Ask your doctor whether you should check your testicles regularly. · Prostate exam. Talk to your doctor about whether you should have a blood test (called a PSA test) for prostate cancer. Experts differ on whether and when men should have this test. Some experts suggest it if you are older than 39 and are -American or have a father or brother who got prostate cancer when he was younger than 72. When should you call for help? Watch closely for changes in your health, and be sure to contact your doctor if you have any problems or symptoms that concern you. Where can you learn more? Go to http://john-kayla.info/. Enter P072 in the search box to learn more about \"Well Visit, Ages 25 to 48: Care Instructions. \"  Current as of: December 13, 2018  Content Version: 12.1  © 3520-7504 [employer], [employer]orporated. Care instructions adapted under license by [employer] (which disclaims liability or warranty for this information). If you have questions about a medical condition or this instruction, always ask your healthcare professional. [place] [address] any warranty or liability for your use of this information. Normal for race/Flushed

## 2019-09-11 ENCOUNTER — OFFICE VISIT (OUTPATIENT)
Dept: OBGYN CLINIC | Age: 34
End: 2019-09-11

## 2019-09-11 VITALS
BODY MASS INDEX: 26.98 KG/M2 | HEIGHT: 62 IN | WEIGHT: 146.6 LBS | SYSTOLIC BLOOD PRESSURE: 100 MMHG | DIASTOLIC BLOOD PRESSURE: 60 MMHG

## 2019-09-11 DIAGNOSIS — Z01.411 ENCOUNTER FOR GYNECOLOGICAL EXAMINATION (GENERAL) (ROUTINE) WITH ABNORMAL FINDINGS: Primary | ICD-10-CM

## 2019-09-11 DIAGNOSIS — Z97.5 IUD (INTRAUTERINE DEVICE) IN PLACE: ICD-10-CM

## 2019-09-11 DIAGNOSIS — Z11.3 SCREEN FOR STD (SEXUALLY TRANSMITTED DISEASE): ICD-10-CM

## 2019-09-11 NOTE — PROGRESS NOTES
[facility]  [email or web address]  [phone]    Pilar Hendrickson MD, FACOG       Annual Gynecologic Exam:  WWE <40  Chief Complaint   Patient presents with    Well Woman    Other     iud check    Other     std screening (declines blood)         Robert Harper is a 35 y.o. [address] female who presents for an annual well woman exam.  Patient's last menstrual period was 2019. .    With regard to the Gardisil vaccine, she is older than the FDA approved age to receive it. She does report additional concerns today. Does continue to have crampng in between cycles and occasional breast tenderness around menses. Menstrual status:  Her periods are spotting. She does not report dysmenorrhea/painful menses. She does not report irregular bleeding. Sexual history and Contraception:  Social History     Substance and Sexual Activity   Sexual Activity Yes    Partners: Male    Birth control/protection: IUD     She never use condoms with sexual activity  She does not reports new sexual partner(s) in the last year. The patient does not request STD testing. We recommended testing per CDC guidelines and at patient request.     Preventive Medicine History:  Her most recent Pap smear result: normal was obtained in 2016  Her most recent HR HPV screen was Negative obtained in 2016  She does not have a history of ANTHONY 2, 3 or cervical cancer.      Past Medical History:   Diagnosis Date    Abnormal Pap smear      1/15    Chlamydia     not since [facility] 10th grade    Encounter for insertion of mirena IUD 2/23/15    removal in 5 years   Javad Heredia Gonorrhea     last episode in [employer] [facility] 10 th grade age 12 tx received    Pap smear for cervical cancer screening 16    Neg,HPV Neg    [facility]     OB History    Para Term  AB Living   3 1   1 2 1   SAB TAB Ectopic Molar Multiple Live Births   1 1 0   0        # Outcome Date GA Lbr Deny/2nd Weight Sex Delivery Anes PTL Lv   3 TAB            2             1 SAB               Obstetric Comments   Menarche:  10. LMP: 3/26/18. # of Children:  2. Age at Delivery of First Child:  21.   Hysterectomy/oophorectomy:  NO/NO. Breast Bx:  no.  Hx of Breast Feeding:  yes. BCP:  yes. Hormone therapy:  no.         History reviewed. No pertinent surgical history. Family History   Problem Relation Age of Onset    Headache Mother    Xena Thapa Migraines Mother     Hypertension Mother     Arthritis-osteo Maternal Aunt     Alcohol abuse Maternal Grandfather     Cancer Paternal Grandmother     Breast Cancer Paternal Grandmother         31-43    Asthma Paternal Grandfather      Social History     Socioeconomic History    Marital status: SINGLE     Spouse name: Not on file    Number of children: Not on file    Years of education: Not on file    Highest education level: Not on file   Occupational History    Not on file   Social Needs    Financial resource strain: Not on file    Food insecurity:     Worry: Not on file     Inability: Not on file    Transportation needs:     Medical: Not on file     Non-medical: Not on file   Tobacco Use    Smoking status: Former Smoker    Smokeless tobacco: Never Used   Substance and Sexual Activity    Alcohol use:  Yes     Alcohol/week: 1.0 standard drinks     Types: 1 Standard drinks or equivalent per week    Drug use: No    Sexual activity: Yes     Partners: Male     Birth control/protection: IUD   Lifestyle    Physical activity:     Days per week: Not on file     Minutes per session: Not on file    Stress: Not on file   Relationships    Social connections:     Talks on phone: Not on file     Gets together: Not on file     Attends Scientologist service: Not on file     Active member of club or organization: Not on file     Attends meetings of clubs or organizations: Not on file     Relationship status: Not on file   Xena Thapa Intimate partner violence:     Fear of current or ex partner: Not on file     Emotionally abused: Not on file     Physically abused: Not on file     Forced sexual activity: Not on file   Other Topics Concern    Not on file   Social History Narrative    Not on file       Allergies   Allergen Reactions    Latex, Natural Rubber Swelling    Clindamycin Hives       Current Outpatient Medications   Medication Sig    levonorgestrel (MIRENA) 20 mcg/24 hr (5 years) IUD 1 Each by IntraUTERine route once.  penicillin v potassium (VEETID) 250 mg tablet Take  by mouth four (4) times daily. No current facility-administered medications for this visit.         Patient Active Problem List   Diagnosis Code    Breast mass, right N63.10    Mastodynia of right breast N64.4       Review of Systems - History obtained from the patient  Constitutional: negative for weight loss, fever, night sweats  HEENT: negative for hearing loss, earache, congestion, snoring, sorethroat  CV: negative for chest pain, palpitations, edema  Resp: negative for cough, shortness of breath, wheezing  GI: negative for change in bowel habits, abdominal pain, black or bloody stools  : negative for frequency, dysuria, hematuria  GYN: see HPI  MSK: negative for back pain, joint pain, muscle pain  Breast: negative for breast lumps, nipple discharge, galactorrhea  Skin :negative for itching, rash, hives  Neuro: negative for dizziness, headache, confusion, weakness  Psych: negative for anxiety, depression, change in mood  Heme/lymph: negative for bleeding, bruising, pallor      (WWE continued)     Physical Exam  Visit Vitals  /60 (BP 1 Location: Left arm, BP Patient Position: Sitting)   Ht 5' 2\" (1.575 m)   Wt 146 lb 9.6 oz (66.5 kg)   LMP 09/06/2019   BMI 26.81 kg/m²       Constitutional  · Appearance: well-nourished, well developed, alert, in no acute distress    HENT  · Head and Face: appears normal    Neck  · Inspection/Palpation: normal appearance, no masses or tenderness  · Lymph Nodes: no lymphadenopathy present  · Thyroid: gland size normal, nontender, no nodules or masses present on palpation    Chest  · Respiratory Effort: breathing unlabored  · Auscultation: normal breath sounds    Cardiovascular  · Heart:  · Auscultation: regular rate and rhythm without murmur    Breasts  · Inspection of Breasts: breasts symmetrical, no skin changes, no discharge present, nipple appearance normal, no skin retraction present  · Palpation of Breasts and Axillae: no masses present on palpation, no breast tenderness  · Axillary Lymph Nodes: no lymphadenopathy present    Gastrointestinal  · Abdominal Examination: abdomen non-tender to palpation, normal bowel sounds, no masses present  · Liver and spleen: no hepatomegaly present, spleen not palpable  · Hernias: no hernias identified    Genitourinary  · External Genitalia: normal appearance for age, no discharge present, no tenderness present, no inflammatory lesions present, no masses present  · Vagina: normal vaginal vault without central or paravaginal defects, blood tinge discharge present, no inflammatory lesions present, no masses present  · Bladder: non-tender to palpation  · Urethra: appears normal  · Cervix: normal   IUD strings seen and appropriate length  · Uterus: normal size, shape and consistency  · Adnexa: no adnexal tenderness present, no adnexal masses present  · Perineum: perineum within normal limits, no evidence of trauma, no rashes or skin lesions present  · Anus: anus within normal limits, no hemorrhoids present  · Inguinal Lymph Nodes: no lymphadenopathy present    Skin  · General Inspection: no rash, no lesions identified    Neurologic/Psychiatric  · Mental Status:  · Orientation: grossly oriented to person, place and time  · Mood and Affect: mood normal, affect appropriate    Assessment:  35 y.o.  for well woman exam  Encounter Diagnoses   Name Primary?     Encounter for gynecological examination (general) (routine) with abnormal findings Yes    IUD (intrauterine device) in place        Plan:  The patient was counseled about diet, exercise, healthy lifestyle  We discussed self breast exam  We discussed safer sex practices, condom use and risk factors for sexually transmitted diseases. We discussed current pap smear and HR HPV testing guidelines. We recommend follow up one year for routine annual gynecologic exam or sooner prn  We recommend routine follow up with her primary care doctor for management of chronic medical problems and non-gynecologic concerns  Handouts were given to the patient  We discussed calcium/vitamin D/weight bearing exercise and osteoporosis prevention  IUD exchange jan or early feb, 2020  IUD h/o, mirena  Std testing on pap per pt request  We discussed progesterone only and non hormonal options for contraception including but not limited to condoms, IUDs, Nexplanon, and depo provera. Folllow up:  [x] return for annual well woman exam in one year or sooner if she is having problems  [] follow up and ultrasound  [] 6 months  [] 3 months  [] 6 weeks   [] 1 month    Orders Placed This Encounter    PAP IG, HPV AND RFX HPV 05/36,51([id number])    PAP IG, CT-NG TV HPV 16&18,45 ([id number], [id number])       No results found for any visits on 09/11/19.

## 2019-09-19 LAB
C TRACH RRNA CVX QL NAA+PROBE: NEGATIVE
CYTOLOGIST CVX/VAG CYTO: ABNORMAL
CYTOLOGY CVX/VAG DOC CYTO: ABNORMAL
CYTOLOGY CVX/VAG DOC THIN PREP: ABNORMAL
CYTOLOGY HISTORY:: ABNORMAL
DX ICD CODE: ABNORMAL
DX ICD CODE: ABNORMAL
HPV I/H RISK 1 DNA CVX QL PROBE+SIG AMP: POSITIVE
Lab: ABNORMAL
N GONORRHOEA RRNA CVX QL NAA+PROBE: NEGATIVE
OTHER STN SPEC: ABNORMAL
PATHOLOGIST CVX/VAG CYTO: ABNORMAL
STAT OF ADQ CVX/VAG CYTO-IMP: ABNORMAL
T VAGINALIS RRNA SPEC QL NAA+PROBE: NEGATIVE

## 2019-09-25 NOTE — PATIENT INSTRUCTIONS
Colposcopy: What to Expect at [address] may feel some soreness in your vagina for a day or two if you had a biopsy. Some vaginal bleeding or discharge is normal for up to a week after a biopsy. The discharge may be dark-colored if a solution was put on your cervix. You can use a sanitary pad for the bleeding. It may take a week or two for you to get the test results. This care sheet gives you a general idea about how long it will take for you to recover. But each person recovers at a different pace. Follow the steps below to feel better as quickly as possible. How can you care for yourself at home? Activity    · You can return to work and most daily activities right after the test.   Exercise    · Do not exercise for 1 day after the test.   Medicines    · Your doctor will tell you if and when you can restart your medicines. He or she will also give you instructions about taking any new medicines.     · If you take blood thinners, such as warfarin (Coumadin), clopidogrel (Plavix), or aspirin, be sure to talk to your doctor. He or she will tell you if and when to start taking those medicines again. Make sure that you understand exactly what your doctor wants you to do.     · Take an over-the-counter pain medicine, such as acetaminophen (Tylenol), ibuprofen (Advil, Motrin), or naproxen (Aleve). Be safe with medicines. Read and follow all instructions on the label. Do not take two or more pain medicines at the same time unless the doctor told you to. Many pain medicines have acetaminophen, which is Tylenol. Too much acetaminophen (Tylenol) can be harmful. Other instructions    · Use a pad if you have some bleeding.     · Do not douche, have sexual intercourse, or use tampons for 1 week if you had a biopsy. This will allow time for your cervix to heal.     · You can take a bath or shower anytime after the test.   Follow-up care is a key part of your treatment and safety.  Be sure to make and go to all appointments, and call your doctor if you are having problems. It's also a good idea to know your test results and keep a list of the medicines you take. When should you call for help? Call your doctor now or seek immediate medical care if:    · You have severe vaginal bleeding. This means that you are soaking through your usual pads or tampons each hour for 2 or more hours.     · You have pain that does not get better after you take pain medicine.     · You have signs of infection, such as:  ? Increased pain. ? Bad-smelling vaginal discharge. ? A fever.    Watch closely for any changes in your health, and be sure to contact your doctor if:    · You have questions or concerns. Where can you learn more? Go to http://john-kayla.info/. Enter M523 in the search box to learn more about \"Colposcopy: What to Expect at Home. \"  Current as of: December 19, 2018  Content Version: 12.2  © 3202-7695 [employer]. Care instructions adapted under license by [employer] (which disclaims liability or warranty for this information). If you have questions about a medical condition or this instruction, always ask your healthcare professional. [address] any warranty or liability for your use of this information.

## 2019-09-25 NOTE — PROGRESS NOTES
[facility]  [email or web address]  [phone]    Darl Scheuermann, MD, [address] [facility]       Ob/Gyn visit    Chief Complaint:   Chief Complaint   Patient presents with    Colposcopy       History of Present Illness: This is a new problem being evaluated by this provider. Aislinn Vazquez is a , 35 y.o. female [address]   She presents for an appointment for a pap smear abnormality consisting of HSIL and HPV in 2019. Cervical cancer risk assessment:  Number of lifetime sexual partners: 13  Approximate age of initiation of sexual intercourse: 12years old  Tobacco use history: former smoker  Current tobacco use: No  HPV vaccine history: No  Sexually transmitted disease exposure: Yes    Currently taking PNV/folic acid: NO, but taking a daily MV    LMP: Patient's last menstrual period was 2019. PFSH:  Past Medical History:   Diagnosis Date    Abnormal Pap smear      1/15    Chlam[facility]     [facility]    Encounter for insertion of mirena IUD 2/23/15    removal in 5 years   Liana Albrecht Gonorrhea     [facility] 12 tx received    Pap smear for cervical cancer screening 16    Neg,HPV Neg    Psychiatric problem     [facility]     History reviewed. No pertinent surgical history.   Family History   Problem Relation Age of Onset    Headache Mother    Liana Albrecht Migraines Mother     Hypertension Mother     Arthritis-osteo Maternal Aunt     Alcohol abuse Maternal Grandfather     Cancer Paternal Grandmother     Breast Cancer Paternal Grandmother         31-43    Asthma Paternal Grandfather      Social History     Socioeconomic History    Marital status: SINGLE     Spouse name: Not on file    Number of children: Not on file    Years of education: Not on file    Highest education level: Not on file   Occupational History    Not on file   Social Needs    Financial resource strain: Not on file    Food insecurity:     Worry: Not on file     Inability: Not on file    Transportation needs:     Medical: Not on file     Non-medical: Not on file   Tobacco Use    Smoking status: Former Smoker    Smokeless tobacco: Never Used   Substance and Sexual Activity    Alcohol use: Yes     Alcohol/week: 1.0 standard drinks     Types: 1 Standard drinks or equivalent per week    Drug use: No    Sexual activity: Yes     Partners: Male     Birth control/protection: IUD   Lifestyle    Physical activity:     Days per week: Not on file     Minutes per session: Not on file    Stress: Not on file   Relationships    Social connections:     Talks on phone: Not on file     Gets together: Not on file     Attends [employer] service: Not on file     Active member of club or organization: Not on file     Attends meetings of clubs or organizations: Not on file     Relationship status: Not on file    Intimate partner violence:     Fear of current or ex partner: Not on file     Emotionally abused: Not on file     Physically abused: Not on file     Forced sexual activity: Not on file   Other Topics Concern    Not on file   Social History Narrative    Not on file       Allergies   Allergen Reactions    Latex, Natural Rubber Swelling    Clindamycin Hives     Current Outpatient Medications   Medication Sig    multivitamin (ONE A DAY) tablet Take 1 Tab by mouth daily.  cetirizine (ZYRTEC) 10 mg tablet Take  by mouth.  levonorgestrel (MIRENA) 20 mcg/24 hr (5 years) IUD 1 Each by IntraUTERine route once.  penicillin v potassium (VEETID) 250 mg tablet Take  by mouth four (4) times daily. No current facility-administered medications for this visit.         Review of Systems:  History obtained from the patient  Constitutional: negative for fevers, chills and weight loss  ENT ROS: negative for - hearing change, oral lesions or visual changes  Respiratory: negative for cough, wheezing or dyspnea on exertion  Cardiovascular: negative for chest pain, irregular heart beats, exertional chest pressure/discomfort  Gastrointestinal: negative for dysphagia, nausea and vomiting  Genito-Urinary ROS: no dysuria, trouble voiding, or hematuria  Inteument/breast: negative for rash, breast lump and nipple discharge  Musculoskeletal:negative for stiff joints, neck pain and muscle weakness  Endocrine ROS: negative for - breast changes, galactorrhea or temperature intolerance  Hematological and Lymphatic ROS: negative for - blood clots, bruising or swollen lymph nodes    Physical Exam:  Visit Vitals  /64   Ht 5' 2\" (1.575 m)   Wt 147 lb (66.7 kg)   BMI 26.89 kg/m²       GENERAL: alert, well appearing, and in no distress  HEAD: normocephalic, atraumatic. ABDOMEN: soft, nontender, nondistended, no masses or organomegaly   EGBUS: no lesions, no inflammation, no masses  VULVA: normal appearing vulva with no masses, tenderness or lesions  VAGINA: normal appearing vagina with normal color, no lesions, thin white discharge  CERVIX: normal appearing cervix without discharge or lesions, non tender  · IUD strings seen and about 1 cm    UTERUS: uterus is normal size, shape, consistency and nontender   ADNEXA: normal adnexa in size, nontender and no masses  NEURO: alert, oriented, normal speech    Assessment:  Encounter Diagnoses   Name Primary?  Pap smear of cervix with high grade squamous intraepithelial lesion (HGSIL) Yes    HPV (human papilloma virus) infection        Plan:  The patient is advised that she should contact the office if she does not note improvement or if symptoms recur  She should contact our office with any questions or concerns  She could keep her routine annual exam appointment. We reviewed her pap smear results and risk factors for cervical cancer. We discussed r/b/a of colposcopy and pt electec to proceed with procedure.     After being presented with the risks, benefits and alternatives has she signed a consent for the procedure. She states that she understands the need for the procedure and has no further questions. She was informed that she may experience discomfort. Disc HPV transmission and typical course of HPV  Discussed safer sex practices, condom use and risk factors for sexually transmitted diseases. Rec PNV    Procedure Note: Colposcopy  Colposcopy procedure note:  Chart reviewed for the following:   Anya TRIPLETT MD, have reviewed the History, Physical and updated the Allergic reactions for [address] performed immediately prior to start of procedure:   Anya TRIPLETT MD, have performed the following reviews on [facility] prior to the start of the procedure:            * Patient was identified by name and date of birth   * Agreement on procedure being performed was verified  * Risks and Benefits explained to the patient  * Procedure site verified and marked as necessary  * Patient was positioned for comfort  * Consent was signed and verified  Time: 0805  Date of procedure: 9/26/2019    Procedure performed by: Royal Tommy MD  Provider assisted by: MIRANDA Sanchez LPN  Patient assisted by: patient  How tolerated by patient: tolerated the procedure well with no complications  Comments: none    She was positioned in the dorsal lithotomy position and a speculum was inserted into the vagina. Dilute acetic acid was applied to the cervix. The colposcope was used to visualize the cervix with white and green light. The transformation zone was completely visualized. This colposcopy was satisfactory. Findings:   The procedure was notable for white epithelium on the cervix. Biopsies were taken from the cervix . See accompanying image for biopsy sites. Monsels was applied to the cervix to obtain hemostasis. Endocervical currettage: An endocervical curettage was performed followed by a brush. Post Procedure Status:  The patient tolerated the procedure well with minimal discomfort. She was observed for 10 minutes and released in good condition. She was given routine post-colposcopy instructions and handouts. She should notify us with any concerns, fevers or heavy bleeding. She was informed that she will be contacted with the pathology results and recommendation for follow-up.

## 2019-09-25 NOTE — PROGRESS NOTES
Patient advised of MD reviewed labs and recommendations. Patient advised regarding stopping smoking if smoking and taking a pre rojas vitamin with folic acid to help clear the virus. This nurse offered to send information regarding HPV and patient declined. Patient advised of safe sex practices. Patient placed on the schedule for 7:40am tomorrow 19 to have the colposcopy done. Patient provided with instructions for prior to the procedure. Patient verbalized understanding.

## 2019-09-26 ENCOUNTER — OFFICE VISIT (OUTPATIENT)
Dept: OBGYN CLINIC | Age: 34
End: 2019-09-26

## 2019-09-26 ENCOUNTER — HOSPITAL ENCOUNTER (OUTPATIENT)
Dept: LAB | Age: 34
Discharge: HOME | End: 2019-09-26

## 2019-09-26 VITALS
BODY MASS INDEX: 27.05 KG/M2 | SYSTOLIC BLOOD PRESSURE: 126 MMHG | WEIGHT: 147 LBS | DIASTOLIC BLOOD PRESSURE: 64 MMHG | HEIGHT: 62 IN

## 2019-09-26 DIAGNOSIS — B97.7 HPV (HUMAN PAPILLOMA VIRUS) INFECTION: ICD-10-CM

## 2019-09-26 DIAGNOSIS — Z32.02 PREGNANCY EXAMINATION OR TEST, NEGATIVE RESULT: ICD-10-CM

## 2019-09-26 DIAGNOSIS — R87.613 PAP SMEAR OF CERVIX WITH HIGH GRADE SQUAMOUS INTRAEPITHELIAL LESION (HGSIL): Primary | ICD-10-CM

## 2019-09-26 DIAGNOSIS — R87.810 CERVICAL HIGH RISK HUMAN PAPILLOMAVIRUS (HPV) DNA TEST POSITIVE: ICD-10-CM

## 2019-09-26 LAB
HCG URINE, QL. (POC): NEGATIVE
VALID INTERNAL CONTROL?: YES

## 2019-09-26 RX ORDER — BISMUTH SUBSALICYLATE 262 MG
1 TABLET,CHEWABLE ORAL DAILY
COMMUNITY
End: 2021-11-10

## 2019-09-26 RX ORDER — CETIRIZINE HCL 10 MG
TABLET ORAL
COMMUNITY
End: 2020-05-27

## 2019-10-01 ENCOUNTER — TELEPHONE (OUTPATIENT)
Dept: OBGYN CLINIC | Age: 34
End: 2019-10-01

## 2019-10-01 RX ORDER — METRONIDAZOLE 7.5 MG/G
1 GEL VAGINAL
Qty: 25 G | Refills: 0 | Status: SHIPPED | OUTPATIENT
Start: 2019-10-01 | End: 2019-10-06

## 2019-10-01 NOTE — TELEPHONE ENCOUNTER
Call received at 354PM    35year old patient last seen in the office on 9/26/19. Patient calling back to check on the results of her colposcopy. This nurse advised that the results are still pending. Patient sent a my chart message regarding foul odor and discharge since the procedure. See request    Hernan Garcia To  [facility] Sent  9/30/2019 10:50 PM   Michell Dempsey,     Can you refill the metronidazole vaginal gel for me please? Im sure the liquid bandage you applied to my cervix last week threw my ph balance off. Now I have bv symptoms.     Previous Messages     Please advise     Pharmacy confirmed

## 2019-10-02 NOTE — TELEPHONE ENCOUNTER
rx sent, request 10-14 d for me to obtain the pathology results and review them and notify pt.     thanks

## 2019-10-02 NOTE — TELEPHONE ENCOUNTER
Patient advised of MD recommendations and prescription sent by MD to patient preferred pharmacy. Patient verbalized understanding.

## 2019-10-03 NOTE — PROGRESS NOTES
Pathology normal/benign. Please notify patient.   Update FS per pathology protocol 1/16  Tickle pap 1 yr w hpv

## 2019-10-24 ENCOUNTER — TELEPHONE (OUTPATIENT)
Dept: OBGYN CLINIC | Age: 34
End: 2019-10-24

## 2019-10-24 NOTE — TELEPHONE ENCOUNTER
Call received at 135PM    29year old patient last seen in the office on 9/26/19    Patient calling to say that she had not heard about her colposcopy results. Patient advised of MD reviewed lab results and recommendations. Patient states she did not get a my chart notification. Patient provided with the phone number for my chart help. Patient verbalized understanding.

## 2020-01-22 ENCOUNTER — OFFICE VISIT (OUTPATIENT)
Dept: OBGYN CLINIC | Age: 35
End: 2020-01-22

## 2020-01-22 VITALS
BODY MASS INDEX: 27.42 KG/M2 | WEIGHT: 149 LBS | SYSTOLIC BLOOD PRESSURE: 114 MMHG | DIASTOLIC BLOOD PRESSURE: 62 MMHG | HEIGHT: 62 IN

## 2020-01-22 DIAGNOSIS — Z97.5 IUD (INTRAUTERINE DEVICE) IN PLACE: ICD-10-CM

## 2020-01-22 DIAGNOSIS — N94.10 DYSPAREUNIA IN FEMALE: ICD-10-CM

## 2020-01-22 DIAGNOSIS — N93.9 ABNORMAL UTERINE BLEEDING: ICD-10-CM

## 2020-01-22 DIAGNOSIS — Z30.432 ENCOUNTER FOR IUD REMOVAL: Primary | ICD-10-CM

## 2020-01-22 RX ORDER — NORETHINDRONE ACETATE AND ETHINYL ESTRADIOL 1MG-20(21)
1 KIT ORAL DAILY
Qty: 3 PACKAGE | Refills: 2 | Status: SHIPPED | OUTPATIENT
Start: 2020-01-22 | End: 2020-05-27

## 2020-01-22 NOTE — PATIENT INSTRUCTIONS
IUD Removal: Care Instructions Your Care Instructions The intrauterine device (IUD) is a method of birth control. It is a small, plastic, T-shaped device that contains copper or hormones. It is placed in your uterus. You may have had your IUD removed because you want to become pregnant. Or maybe it caused pain, bleeding, or an infection. You may have chosen another method of birth control. If you don't want to get pregnant, make sure to use another form of birth control now that your IUD is not in place. Talk to your doctor about other forms of birth control. Follow-up care is a key part of your treatment and safety. Be sure to make and go to all appointments, and call your doctor if you are having problems. It's also a good idea to know your test results and keep a list of the medicines you take. How can you care for yourself at home? · IUD removal does not usually cause any pain or problems if the IUD is removed because you want to become pregnant or because of bleeding. · Once the IUD is taken out, you can become pregnant. If you want to become pregnant, you can start trying to have a baby as soon as you like. · If your doctor prescribed antibiotics because of an infection, take them as directed. Do not stop taking them just because you feel better. You need to take the full course of antibiotics. When should you call for help? Call your doctor now or seek immediate medical care if: 
  · You have pain in your belly or pelvis.  
  · You have severe vaginal bleeding. This means that you are soaking through your usual pads or tampons every hour for 2 or more hours.  
  · You have a fever.  
  · You have a vaginal discharge that smells bad.  
 Watch closely for changes in your health, and be sure to contact your doctor if you have any problems. Where can you learn more? Go to http://john-kayla.info/.  
Enter G154 in the search box to learn more about \"IUD Removal: Care Instructions. \" Current as of: May 29, 2019 Content Version: 12.2 © 5714-2970 [employer]. Care instructions adapted under license by [employer] (which disclaims liability or warranty for this information). If you have questions about a medical condition or this instruction, always ask your healthcare professional. [address] any warranty or liability for your use of this information.

## 2020-01-22 NOTE — PROGRESS NOTES
[facility]  [email or web address]  [phone]    Cherie Castle MD, FACOG       OB/GYN Problem visit    Chief Complaint:   Chief Complaint   Patient presents with    Removal Iud     Family Planning       History of Present Illness: This is not a new problem being evaluated by this provider. The patient is a 29 y.o.  female who presents for IUD removal and would like to change her method of controlling her cycles, because of AUB, and dyspareunia. She reports the symptoms are is unchanged. Aggravating factors include none. Alleviating factors include none. She does not have other concerns. LMP: No LMP recorded. (Menstrual status: IUD). PFSH:  Past Medical History:   Diagnosis Date    Abnormal Pap smear      1/15    Chlamydia     not since [employer] [facility] 10th grade    ANTHONY I (cervical intraepithelial neoplasia I) 2019    Encounter for insertion of mirena IUD 2/23/15    removal in 5 years   [facility] Gonorrhea     last episode in [employer] 10 th grade age 12 tx received    Pap smear for cervical cancer screening 16    Neg,HPV Neg    Psychiatric problem     depression in [employer]     Past Surgical History:   Procedure Laterality Date    HX COLPOSCOPY  2019    ANTHONY I, benign     Family History   Problem Relation Age of Onset    Headache Mother     Migraines Mother     Hypertension Mother     Arthritis-osteo Maternal Aunt     Alcohol abuse Maternal Grandfather     Cancer Paternal Grandmother     Breast Cancer Paternal Grandmother         31-43    Asthma Paternal Grandfather      Social History     Tobacco Use    Smoking status: Former Smoker    Smokeless tobacco: Never Used   Substance Use Topics    Alcohol use:  Yes     Alcohol/week: 1.0 standard drinks     Types: 1 Standard drinks or equivalent per week    Drug use: No     Allergies   Allergen Reactions    Latex, Natural Rubber Swelling    Clindamycin Hives     Current Outpatient Medications   Medication Sig    norethindrone-ethinyl estradiol (JUNEL FE 1/20) 1 mg-20 mcg (21)/75 mg (7) tab Take 1 Tab by mouth daily.  multivitamin (ONE A DAY) tablet Take 1 Tab by mouth daily.  levonorgestrel (MIRENA) 20 mcg/24 hr (5 years) IUD 1 Each by IntraUTERine route once.  cetirizine (ZYRTEC) 10 mg tablet Take  by mouth.  penicillin v potassium (VEETID) 250 mg tablet Take  by mouth four (4) times daily. No current facility-administered medications for this visit. Review of Systems:  History obtained from the patient  Constitutional: negative for fevers, chills and weight loss  ENT ROS: negative for - hearing change, oral lesions or visual changes  Respiratory: negative for cough, wheezing or dyspnea on exertion  Cardiovascular: negative for chest pain, irregular heart beats, exertional chest pressure/discomfort  Gastrointestinal: negative for dysphagia, nausea and vomiting  Genito-Urinary ROS:  see HPI  Inteument/breast: negative for rash, breast lump and nipple discharge  Musculoskeletal:negative for stiff joints, neck pain and muscle weakness  Endocrine ROS: negative for - breast changes, galactorrhea or temperature intolerance  Hematological and Lymphatic ROS: negative for - blood clots, bruising or swollen lymph nodes    Physical Exam:  Visit Vitals  /62   Ht 5' 2\" (1.575 m)   Wt 149 lb (67.6 kg)   BMI 27.25 kg/m²       GENERAL: alert, well appearing, and in no distress  HEAD: normocephalic, atraumatic.    PULM: clear to auscultation, no wheezes, rales or rhonchi, symmetric air entry   COR: normal rate and regular rhythm, S1 and S2 normal   ABDOMEN: soft, nontender, nondistended, no masses or organomegaly   EGBUS: no lesions, no inflammation, no masses  VULVA: normal appearing vulva with no masses, tenderness or lesions  VAGINA: normal appearing vagina with normal color, no lesions, no discharge  CERVIX: normal appearing cervix without discharge or lesions, non tender  · IUD strings seen and appropriate length  UTERUS: uterus is normal size, shape, consistency and nontender   ADNEXA: normal adnexa in size, nontender and no masses  NEURO: alert, oriented, normal speech    Assessment:  Encounter Diagnoses   Name Primary?  Encounter for IUD removal Yes    Abnormal uterine bleeding     IUD (intrauterine device) in place     Dyspareunia in female        Plan:  The patient is advised that she should contact the office if she does not note improvement or if symptoms recur  Recommend follow up with PCP for non-gynecologic complaints and chronic medical problems. She should contact our office with any questions or concerns  She could keep her routine annual exam appointment. Notify MD if AUB and dyspareunia does not resolve with IUD removal  We discussed timed intercourse, menstrual charting, and s/sx of ovulation. I recommended a daily prenatal vitamin. We discussed that if conception does not occur within one year then additional evaluation may be indicated. OCP ho  Disc back up with OCP start and rec start ocp with menses    Orders Placed This Encounter    [id number] - REMOVE INTRAUTERINE DEVICE    norethindrone-ethinyl estradiol (JUNEL FE 1/20) 1 mg-20 mcg (21)/75 mg (7) tab       No results found for this visit on 01/22/20. [facility]  [email or web address]  [phone]    Kelly Urban MD, FACOG     IUD REMOVAL  OFFICE PROCEDURE PROGRESS NOTE      Chart reviewed for the following:   I, Wilbert Ferraro MD, have reviewed the History, Physical and updated the Allergic reactions for [address] [place] [address] performed immediately prior to start of procedure:   Charlene Edwards MD, have performed the following reviews on [address] prior to the start of the procedure:            * Patient was identified by name and date of birth   * Agreement on procedure being performed was verified  * Risks and Benefits explained to the patient  * Procedure site verified and marked as necessary  * Patient was positioned for comfort  * Consent was signed and verified     Time: 1:56pm    Date of procedure: 2020    Procedure performed by: Lisandra Jmienez MD    Provider assisted by: Stuart Jade LPN    Patient assisted by: self    How tolerated by patient: tolerated the procedure well with no complications    Comments: none      Procedure Note: IUD REMOVAL    Greg Moraes is a ,  29 y.o. female [address] whose No LMP recorded. (Menstrual status: IUD). was on . who presents today for IUD removal. Her current IUD was placed in . She requests removal of the IUD because the IUD because  she wants to consider an alternative treatment. The IUD removal procedure was discussed with the patient and she had no further questions. Procedure: The patient was placed in a dorsal lithotomy position and appropriately draped. A speculum exam was performed and the cervix was visualized. The cervix was prepped with zephiran solution. The IUD string was visualized. Using ring forceps , the string was grasped and the IUD removed intact. The IUD was shown to the patient and discarded. On bimanual exam the uterus was anterior and was normal in size with no tenderness present. She was given routine post-removal instructions and instructed to notify MD with any questions or concerns.

## 2020-02-26 ENCOUNTER — OFFICE VISIT (OUTPATIENT)
Dept: OBGYN CLINIC | Age: 35
End: 2020-02-26

## 2020-02-26 VITALS — SYSTOLIC BLOOD PRESSURE: 116 MMHG | DIASTOLIC BLOOD PRESSURE: 62 MMHG | BODY MASS INDEX: 27.62 KG/M2 | WEIGHT: 151 LBS

## 2020-02-26 DIAGNOSIS — N76.2 ACUTE VULVITIS: ICD-10-CM

## 2020-02-26 DIAGNOSIS — Z20.2 STD EXPOSURE: Primary | ICD-10-CM

## 2020-02-26 LAB — WET MOUNT POCT, WMPOCT: NORMAL

## 2020-02-26 RX ORDER — NYSTATIN AND TRIAMCINOLONE ACETONIDE 100000; 1 [USP'U]/G; MG/G
OINTMENT TOPICAL 2 TIMES DAILY
Qty: 30 G | Refills: 0 | Status: SHIPPED | OUTPATIENT
Start: 2020-02-26 | End: 2020-03-04

## 2020-02-26 NOTE — PROGRESS NOTES
[address] [facility]  [email or web address]  [phone]    Lyubov Randolph MD, [address] [facility]       OB/GYN Problem visit    Chief Complaint:   Chief Complaint   Patient presents with    Vaginitis       History of Present Illness: This is a new problem being evaluated by this provider. The patient is a 29 y.o.  female who reports having vaginal discomfort since 2020. She reports the symptoms are unchanged. Pt states she wore underwear that were too small and went to the gym. After leaving the gym, her vaginal area felt irritated and there were bumps present. Seeing counselor at [address] [place] [address]. Worried because had HPV pos on pap. She does not have other concerns. LMP: Patient's last menstrual period was 2020 (approximate). PFSH:  Past Medical History:   Diagnosis Date    Abnormal Pap smear      1/15    Chlamydia     not since [facility] 10th grade    ANTHONY I (cervical intraepithelial neoplasia I) 2019    Encounter for insertion of mirena IUD 2/23/15    removal in 5 years   Smith Gonorrhea     last episode in [facility] 10 th grade age 12 tx received    Pap smear for cervical cancer screening 16    Neg,HPV Neg    Psychiatric problem     depression in [facility], currently seeing someone at [facility] 20     Past Surgical History:   Procedure Laterality Date    HX COLPOSCOPY  2019    ANTHONY I, benign     Family History   Problem Relation Age of Onset    Headache Mother     Migraines Mother     Hypertension Mother     Arthritis-osteo Maternal Aunt     Alcohol abuse Maternal Grandfather     Cancer Paternal Grandmother     Breast Cancer Paternal Grandmother         31-43    Asthma Paternal Grandfather      Social History     Tobacco Use    Smoking status: Former Smoker    Smokeless tobacco: Never Used   Substance Use Topics    Alcohol use:  Yes     Alcohol/week: 1.0 standard drinks     Types: 1 Standard drinks or equivalent per week    Drug use: No     Allergies   Allergen Reactions    Latex, Natural Rubber Swelling    Clindamycin Hives     Current Outpatient Medications   Medication Sig    nystatin-triamcinolone (MYCOLOG) 100,000-0.1 unit/gram-% ointment Apply  to affected area two (2) times a day for 7 days.  norethindrone-ethinyl estradiol (JUNEL FE 1/20) 1 mg-20 mcg (21)/75 mg (7) tab Take 1 Tab by mouth daily.  multivitamin (ONE A DAY) tablet Take 1 Tab by mouth daily.  cetirizine (ZYRTEC) 10 mg tablet Take  by mouth.  penicillin v potassium (VEETID) 250 mg tablet Take  by mouth four (4) times daily.  levonorgestrel (MIRENA) 20 mcg/24 hr (5 years) IUD 1 Each by IntraUTERine route once. No current facility-administered medications for this visit. Review of Systems:  History obtained from the patient  Constitutional: negative for fevers, chills and weight loss  ENT ROS: negative for - hearing change, oral lesions or visual changes  Respiratory: negative for cough, wheezing or dyspnea on exertion  Cardiovascular: negative for chest pain, irregular heart beats, exertional chest pressure/discomfort  Gastrointestinal: negative for dysphagia, nausea and vomiting  Genito-Urinary ROS:  see HPI  Inteument/breast: negative for rash, breast lump and nipple discharge  Musculoskeletal:negative for stiff joints, neck pain and muscle weakness  Endocrine ROS: negative for - breast changes, galactorrhea or temperature intolerance  Hematological and Lymphatic ROS: negative for - blood clots, bruising or swollen lymph nodes    Physical Exam:  Visit Vitals  /62 (BP 1 Location: Right arm, BP Patient Position: Sitting)   Wt 151 lb (68.5 kg)   BMI 27.62 kg/m²       GENERAL: alert, well appearing, and in no distress  HEAD: normocephalic, atraumatic.    PULM: clear to auscultation, no wheezes, rales or rhonchi, symmetric air entry   COR: normal rate and regular rhythm, S1 and S2 normal   ABDOMEN: soft, nontender, nondistended, no masses or organomegaly   EGBUS: no lesions, minimal inflammation vulvar mccullough, ? Punctate lesion left clitoral mccullough: hsv  VULVA: normal appearing vulva with no masses, tenderness or lesions  VAGINA: normal appearing vagina with normal color, no lesions, white discharge  CERVIX: normal appearing cervix without discharge or lesions, non tender  UTERUS: uterus is normal size, shape, consistency and nontender   ADNEXA: normal adnexa in size, nontender and no masses  NEURO: alert, oriented, normal speech    Assessment:  Encounter Diagnoses   Name Primary?  STD exposure Yes    Acute vulvitis        Plan:  The patient is advised that she should contact the office if she does not note improvement or if symptoms recur  Recommend follow up with PCP for non-gynecologic complaints and chronic medical problems. She should contact our office with any questions or concerns  She could keep her routine annual exam appointment. We discussed potential causes of vaginal discharge/irritation. We discussed good vulvar hygiene. Recommended avoid vaginal irritants. Discussed use of mild soaps/detergents. Follow up if NI. We reviewed wet prep findings with the patient at her visit. Patient will be notified about swab results and prescription sent, if indicated.    mycolog II  Disc typical course of HPV (abnl pap)  Rec pnv        Orders Placed This Encounter    HERPES SIMPLEX VIRUS (HSV) MOON ([facility])    NUSWAB VAGINITIS PLUS ([facility])    AMB POC WET PREP (AKA STAIN, INTERPRET, WET MOUNT)    nystatin-triamcinolone (MYCOLOG) 100,000-0.1 unit/gram-% ointment       Results for orders placed or performed in visit on 02/26/20   AMB POC SMEAR, STAIN & INTERPRET, WET MOUNT   Result Value Ref Range    Wet mount (POC)      Narrative    BRITTA    Hypae: negative  Buds: negative    Wet Prep:  Trich: negative  Clue cells: negative  Hyphae: negative  Buds: negative  WBC's: normal

## 2020-02-26 NOTE — PATIENT INSTRUCTIONS
Vaginitis: Care Instructions  Your Care Instructions    Vaginitis is soreness or infection of the vagina. This common problem can cause itching and burning. And it can cause a change in vaginal discharge. Sometimes it can cause pain during sex. Vaginitis may be caused by bacteria, yeast, or other germs. Some infections that cause it are caught from a sexual partner. Bath products, spermicides, and douches can irritate the vagina too. Some women have this problem during and after menopause. A drop in estrogen levels during this time can cause dryness, soreness, and pain during sex. Your doctor can give you medicine to treat an infection. And home care may help you feel better. For certain types of infections, your sex partner must be treated too. Follow-up care is a key part of your treatment and safety. Be sure to make and go to all appointments, and call your doctor if you are having problems. It's also a good idea to know your test results and keep a list of the medicines you take. How can you care for yourself at home? · If your doctor prescribed antibiotics, take them as directed. Do not stop taking them just because you feel better. You need to take the full course of antibiotics. · Take your medicines exactly as prescribed. Call your doctor if you think you are having a problem with your medicine. · Do not eat or drink anything that has alcohol if you are taking metronidazole (Flagyl). · If you have a yeast infection, use over-the-counter products as your doctor tells you to. Or take medicine your doctor prescribes exactly as directed. · Wash your vaginal area daily with water. You also can use a mild, unscented soap if you want. · Do not use scented bath products. And do not use vaginal sprays or douches. · Put a washcloth soaked in cool water on the area to relieve itching. Or you can take cool baths.   · If you have dryness because of menopause, use estrogen cream or pills that your doctor prescribes. · Ask your doctor about when it is okay to have sex. · Use a personal lubricant before sex if you have dryness. Examples are Astroglide, K-Y Jelly, and Wet Lubricant Gel. · Ask your doctor if your sex partner also needs treatment. When should you call for help? Call your doctor now or seek immediate medical care if:    · You have a fever and pelvic pain.    Watch closely for changes in your health, and be sure to contact your doctor if:    · You have bleeding other than your period.     · You do not get better as expected. Where can you learn more? Go to http://john-kayla.info/. Enter I255 in the search box to learn more about \"Vaginitis: Care Instructions. \"  Current as of: February 19, 2019  Content Version: 12.2  © 5641-1358 [employer]. Care instructions adapted under license by [employer] (which disclaims liability or warranty for this information). If you have questions about a medical condition or this instruction, always ask your healthcare professional. [address] any warranty or liability for your use of this information.

## 2020-03-01 LAB
A VAGINAE DNA VAG QL NAA+PROBE: ABNORMAL SCORE
BVAB2 DNA VAG QL NAA+PROBE: ABNORMAL SCORE
C ALBICANS DNA VAG QL NAA+PROBE: NEGATIVE
C GLABRATA DNA VAG QL NAA+PROBE: NEGATIVE
C TRACH DNA VAG QL NAA+PROBE: NEGATIVE
HSV1 DNA SPEC QL NAA+PROBE: POSITIVE
HSV2 DNA SPEC QL NAA+PROBE: NEGATIVE
MEGA1 DNA VAG QL NAA+PROBE: ABNORMAL SCORE
N GONORRHOEA DNA VAG QL NAA+PROBE: NEGATIVE
T VAGINALIS DNA VAG QL NAA+PROBE: NEGATIVE

## 2020-03-02 RX ORDER — METRONIDAZOLE 500 MG/1
500 TABLET ORAL 2 TIMES DAILY
Qty: 14 TAB | Refills: 0 | Status: SHIPPED | OUTPATIENT
Start: 2020-03-02 | End: 2020-03-09

## 2020-03-02 RX ORDER — VALACYCLOVIR HYDROCHLORIDE 500 MG/1
500 TABLET, FILM COATED ORAL 2 TIMES DAILY
Qty: 6 TAB | Refills: 3 | Status: SHIPPED | OUTPATIENT
Start: 2020-03-02 | End: 2020-03-05

## 2020-03-02 NOTE — PROGRESS NOTES
Abnormal, notify patient. Valtrex and flagyl rx sent  Abnormal results. Please notify pt. HSV and BV  Update chart  HSV type 1, genital w date of swab  This is a sexually transmitted disease.   Refer to info on [facility] website or consult visit if pt prefers or desires screening for other STD    Valtrex: 500mg 1 po q 12 hours 3d prn sx/outbreak  rx sent    Consistent with BV   Rx:   flagyl 500mg bid   x 7 days  Rx sent    May cause nausea, take with food  Avoid etoh during treatment and 1 day following  Notify MD if NI after 1 week    (If patient prefers vaginal tx't  0.75 %t metronidazole vaginal gel   5 grams qhs per vagina  x5 days)

## 2020-05-12 ENCOUNTER — TELEPHONE (OUTPATIENT)
Dept: OBGYN CLINIC | Age: 35
End: 2020-05-12

## 2020-05-12 NOTE — TELEPHONE ENCOUNTER
Call received kellen 340PM    29year old patient last seen in the office on 2/26/2020. Patient calling to say that for the past two months she has been able to feel a definite lump on her left side, patient reports around the left ovary area. Patient denies pain and reports bleeding only for her regular cycle. Patient is wondering how to proceed.     Please advise

## 2020-05-13 NOTE — TELEPHONE ENCOUNTER
Rec GYN us and fu to evaluate for GYN etiology    Ok to do in next 1-2 weeks.     Luis Angel Navarrete MD

## 2020-05-13 NOTE — TELEPHONE ENCOUNTER
Patient advised of MD recommendations and was placed on the schedule to be seen at 3:30PM for ultrasound and then follow up at 3:50PM    Patient was provided with instructions due to covid 19 and was advised to wear a mask. Patient verbalized understanding.

## 2020-05-27 ENCOUNTER — OFFICE VISIT (OUTPATIENT)
Dept: OBGYN CLINIC | Age: 35
End: 2020-05-27

## 2020-05-27 VITALS
WEIGHT: 148 LBS | SYSTOLIC BLOOD PRESSURE: 116 MMHG | BODY MASS INDEX: 27.23 KG/M2 | HEIGHT: 62 IN | DIASTOLIC BLOOD PRESSURE: 60 MMHG

## 2020-05-27 DIAGNOSIS — R19.04 LEFT LOWER QUADRANT ABDOMINAL MASS: Primary | ICD-10-CM

## 2020-05-27 NOTE — PROGRESS NOTES
[address] [facility]  [email or web address]  [phone]    Navneet Berry MD, [address] [facility]       OB/GYN Problem visit    Chief Complaint:   Chief Complaint   Patient presents with    Ultrasound     ? mass       History of Present Illness: This is a new problem being evaluated by this provider. The patient is a 29 y.o.  female who reports having a lump on the top of her vagina for 2 months. She reports the symptoms are unchanged. Aggravating factors include none. Alleviating factors include none. She does not have other concerns. LMP: Patient's last menstrual period was 2020 (approximate). PFSH:  Past Medical History:   Diagnosis Date    Abnormal Pap smear      1/15    Chlamydia     not since [facility] 10th grade    ANTHONY I (cervical intraepithelial neoplasia I) 2019    Encounter for insertion of mirena IUD 2/23/15    removal in 5 years   [place] Gonorrhea     last episode in [facility] 10 th grade age 12 tx received    Pap smear for cervical cancer screening 16    Neg,HPV Neg    Psychiatric problem     depression in [facility], currently seeing someone at [facility] 20     Past Surgical History:   Procedure Laterality Date    HX COLPOSCOPY  2019    ANTHONY I, benign     Family History   Problem Relation Age of Onset    Headache Mother     Migraines Mother     Hypertension Mother     Arthritis-osteo Maternal Aunt     Alcohol abuse Maternal Grandfather     Cancer Paternal Grandmother     Breast Cancer Paternal Grandmother         31-43    Asthma Paternal Grandfather      Social History     Tobacco Use    Smoking status: Former Smoker    Smokeless tobacco: Never Used   Substance Use Topics    Alcohol use:  Yes     Alcohol/week: 1.0 standard drinks     Types: 1 Standard drinks or equivalent per week    Drug use: No     Allergies   Allergen Reactions    Latex, Natural Rubber Swelling    Clindamycin Hives     Current Outpatient Medications Medication Sig    PNV GN.35/LAWQAMP fum/folic ac (PRENATAL PO) Take  by mouth.  multivitamin (ONE A DAY) tablet Take 1 Tab by mouth daily. No current facility-administered medications for this visit. Review of Systems:  History obtained from the patient  Constitutional: negative for fevers, chills and weight loss  ENT ROS: negative for - hearing change, oral lesions or visual changes  Respiratory: negative for cough, wheezing or dyspnea on exertion  Cardiovascular: negative for chest pain, irregular heart beats, exertional chest pressure/discomfort  Gastrointestinal: negative for dysphagia, nausea and vomiting  Genito-Urinary ROS:  see HPI  Inteument/breast: negative for rash, breast lump and nipple discharge  Musculoskeletal:negative for stiff joints, neck pain and muscle weakness  Endocrine ROS: negative for - breast changes, galactorrhea or temperature intolerance  Hematological and Lymphatic ROS: negative for - blood clots, bruising or swollen lymph nodes    Physical Exam:  Visit Vitals  /60 (BP 1 Location: Left arm, BP Patient Position: Sitting)   Ht 5' 2\" (1.575 m)   Wt 148 lb (67.1 kg)   BMI 27.07 kg/m²       GENERAL: alert, well appearing, and in no distress  HEAD: normocephalic, atraumatic. PULM: clear to auscultation, no wheezes, rales or rhonchi, symmetric air entry   COR: normal rate and regular rhythm, S1 and S2 normal   ABDOMEN: soft, nontender, nondistended, no masses or organomegaly   EGBUS: no lesions, no inflammation, no masses  VULVA: normal appearing vulva with no masses, tenderness or lesions  VAGINA: normal appearing vagina with normal color, no lesions, no discharge  CERVIX: normal appearing cervix without discharge or lesions, non tender  UTERUS: uterus is normal size, shape, consistency and nontender   ADNEXA: normal adnexa in size, nontender and no masses  NEURO: alert, oriented, normal speech      Assessment:  Encounter Diagnoses   Name Primary?     Left lower quadrant abdominal mass Yes    Comment: ? pt feeling bladder vs hernia, benign gyn us       Plan:  The patient is advised that she should contact the office if she does not note improvement or if symptoms recur  Recommend follow up with PCP for non-gynecologic complaints and chronic medical problems. She should contact our office with any questions or concerns  She could keep her routine annual exam appointment. Consider general consult if persists when empties bladder  Disc ? Shadowing inf to bladder, doesn't seem to be clinical significant but finding d/w pt        Physician review of ultrasound performed by technician    Today's ultrasound report and images were reviewed and discussed with the patient. Please see images and imaging report entered by technician in PACS for more detail and progress note and diagnosis entered by MD.    Howie Ray MD      No orders of the defined types were placed in this encounter. No results found for this visit on 05/27/20.

## 2020-05-27 NOTE — PATIENT INSTRUCTIONS

## 2020-06-09 ENCOUNTER — TELEPHONE (OUTPATIENT)
Dept: OBGYN CLINIC | Age: 35
End: 2020-06-09

## 2020-06-09 NOTE — TELEPHONE ENCOUNTER
Ethan from [facility] Dr. Angel Kincaid (surgeon) called. Patient cancelled her appt and said shed call back and reschedule at a later time. Just FYI. Thanks! yes

## 2020-08-26 ENCOUNTER — OFFICE VISIT (OUTPATIENT)
Dept: SURGERY | Age: 35
End: 2020-08-26
Payer: COMMERCIAL

## 2020-08-26 VITALS
WEIGHT: 140.5 LBS | OXYGEN SATURATION: 98 % | SYSTOLIC BLOOD PRESSURE: 118 MMHG | BODY MASS INDEX: 25.86 KG/M2 | TEMPERATURE: 98 F | HEART RATE: 76 BPM | DIASTOLIC BLOOD PRESSURE: 75 MMHG | RESPIRATION RATE: 18 BRPM | HEIGHT: 62 IN

## 2020-08-26 DIAGNOSIS — K40.90 NON-RECURRENT INGUINAL HERNIA OF LEFT SIDE WITHOUT OBSTRUCTION OR GANGRENE: Primary | ICD-10-CM

## 2020-08-26 PROCEDURE — 99203 OFFICE O/P NEW LOW 30 MIN: CPT | Performed by: SURGERY

## 2020-08-26 NOTE — PROGRESS NOTES
Surgery History and Physical    Subjective:      Ricardo Casas is a 29 y.o. black female who presents for evaluation of a left groin mass. For the past 5 months, Ms. Allyson Fothergill has had a mass in her left groin. The mass has become larger, but does fluctuate in size. She is experiencing some pain as well. She denies any pain or mass on the right side. She is eating fine and moving her bowels and urinating normally. She denies any previous hernia repairs or abdominal procedures. She was seen by her PCP and had an US with an indeterminate finding. Past Medical History:   Diagnosis Date    Abnormal Pap smear      1/15    Chlamydia     not since [facility] 10th grade    ANTHONY I (cervical intraepithelial neoplasia I) 2019    Encounter for insertion of mirena IUD 2/23/15    removal in 5 years   [place] Sprain Gonorrhea     last episode in [facility] 10 th grade age 12 tx received    Pap smear for cervical cancer screening 16    Neg,HPV Neg    Psychiatric problem     depression in [facility], currently seeing someone at [facility] 20     Past Surgical History:   Procedure Laterality Date    HX COLPOSCOPY  2019    ANTHONY I, benign      Family History   Problem Relation Age of Onset    Headache Mother     Migraines Mother     Hypertension Mother     Arthritis-osteo Maternal Aunt     Alcohol abuse Maternal Grandfather     Cancer Paternal Grandmother     Breast Cancer Paternal Grandmother         31-43    Asthma Paternal Grandfather      Social History     Tobacco Use    Smoking status: Former Smoker    Smokeless tobacco: Never Used   Substance Use Topics    Alcohol use: Yes     Alcohol/week: 1.0 standard drinks     Types: 1 Standard drinks or equivalent per week      Prior to Admission medications    Medication Sig Start Date End Date Taking? Authorizing Provider   PNV ZH.00/CHAVA fum/folic ac (PRENATAL PO) Take  by mouth.    Yes Provider, Historical   multivitamin (ONE A DAY) tablet Take 1 Tab by mouth daily. Provider, Historical      Allergies   Allergen Reactions    Latex, Natural Rubber Swelling    Clindamycin Hives       Review of Systems:  A comprehensive review of systems was negative except for that written in the History of Present Illness. Objective:     Physical Exam:  GENERAL: alert, cooperative, no distress, appears stated age, EYE: negative findings: anicteric sclera, LYMPHATIC: Cervical, supraclavicular nodes normal. , THROAT & NECK: normal, LUNG: clear to auscultation bilaterally, HEART: regular rate and rhythm, ABDOMEN: Soft, NT, ND., GROIN: On the right, there is no hernia. On the left, there is a reducible inguinal hernia., EXTREMITIES:  no edema, SKIN: Normal., NEUROLOGIC: negative, PSYCHIATRIC: non focal    Assessment:     Left inguinal hernia without gangrene or obstruction. Plan:     I have recommended that Ms. Hugo have the hernia repaired since she is having pain. She will call back when she is ready to schedule.

## 2020-08-26 NOTE — PROGRESS NOTES
1. Have you been to the ER, urgent care clinic since your last visit? Hospitalized since your last visit? No    2. Have you seen or consulted any other health care providers outside of the [address] [place] [address] since your last visit? Include any pap smears or colon screening.  No

## 2020-12-08 ENCOUNTER — HOSPITAL ENCOUNTER (OUTPATIENT)
Dept: LAB | Age: 35
Discharge: HOME | End: 2020-12-08
Payer: COMMERCIAL

## 2020-12-08 ENCOUNTER — OFFICE VISIT (OUTPATIENT)
Dept: OBGYN CLINIC | Age: 35
End: 2020-12-08
Payer: COMMERCIAL

## 2020-12-08 VITALS — DIASTOLIC BLOOD PRESSURE: 69 MMHG | BODY MASS INDEX: 25.46 KG/M2 | WEIGHT: 139.2 LBS | SYSTOLIC BLOOD PRESSURE: 130 MMHG

## 2020-12-08 DIAGNOSIS — Z11.3 SCREEN FOR STD (SEXUALLY TRANSMITTED DISEASE): ICD-10-CM

## 2020-12-08 DIAGNOSIS — Z87.42 H/O ABNORMAL CERVICAL PAPANICOLAOU SMEAR: ICD-10-CM

## 2020-12-08 DIAGNOSIS — Z01.419 ENCOUNTER FOR GYNECOLOGICAL EXAMINATION (GENERAL) (ROUTINE) WITHOUT ABNORMAL FINDINGS: Primary | ICD-10-CM

## 2020-12-08 PROCEDURE — 87624 HPV HI-RISK TYP POOLED RSLT: CPT

## 2020-12-08 PROCEDURE — 99395 PREV VISIT EST AGE 18-39: CPT | Performed by: OBSTETRICS & GYNECOLOGY

## 2020-12-08 NOTE — PROGRESS NOTES
[facility]  [email or web address]  [phone]    Maximus Garcia MD, [facility]       Annual Gynecologic Exam:  WWE <40  Chief Complaint   Patient presents with    Well Woman         Oscar Harrell is a 28 y.o.  [address] female who presents for an annual well woman exam.  Patient's last menstrual period was 2020. .    With regard to the Gardisil vaccine, she is older than the FDA approved age to receive it. She does not report additional concerns today. Menstrual status:  Her periods are moderate. She does not report dysmenorrhea/painful menses. She does not report irregular bleeding. Sexual history and Contraception:  Social History     Substance and Sexual Activity   Sexual Activity Yes    Partners: Male    Birth control/protection: None     She always use condoms with sexual activity  She does reports new sexual partner(s) in the last year. The patient does request STD testing. We recommended testing per CDC guidelines and at patient request.     Preventive Medicine History:  Her most recent Pap smear result: abnormal was obtained in 2019  Her most recent HR HPV screen was Negative obtained in   She does not have a history of ANTHONY 2, 3 or cervical cancer.      Past Medical History:   Diagnosis Date    Abnormal Pap smear 2019    HGSIL     1/15    Chlamydia     not since [facility] 10th grade    ANTHONY I (cervical intraepithelial neoplasia I) 2019    Encounter for insertion of mirena IUD 2/23/15    removal in 5 years   [address] [place] [address] Gonorrhea     last episode in [facility] 10 th grade age 12 tx received    Pap smear for cervical cancer screening 16    Neg,HPV Neg    Psychiatric problem     depression in [facility], currently seeing someone at [facility] one 20     OB History    Para Term  AB Living   3 1   1 2 1   SAB TAB Ectopic Molar Multiple Live Births   1 1 0   0        # Outcome Date GA Lbr Deny/2nd Weight Sex Delivery Anes PTL Lv   3 TAB            2             1 SAB               Obstetric Comments   Menarche:  10. LMP: 3/26/18. # of Children:  2. Age at Delivery of First Child:  21.   Hysterectomy/oophorectomy:  NO/NO. Breast Bx:  no.  Hx of Breast Feeding:  yes. BCP:  yes. Hormone therapy:  no.         Past Surgical History:   Procedure Laterality Date    HX COLPOSCOPY  2019    ANTHONY I, benign    HX HERNIA REPAIR  10/02/2020     Family History   Problem Relation Age of Onset    Headache Mother     Migraines Mother     Hypertension Mother     Arthritis-osteo Maternal Aunt     Alcohol abuse Maternal Grandfather     Cancer Paternal Grandmother     Breast Cancer Paternal Grandmother         31-43    Asthma Paternal Grandfather      Social History     Socioeconomic History    Marital status: SINGLE     Spouse name: Not on file    Number of children: Not on file    Years of education: Not on file    Highest education level: Not on file   Occupational History    Not on file   Social Needs    Financial resource strain: Not on file    Food insecurity     Worry: Not on file     Inability: Not on file    Transportation needs     Medical: Not on file     Non-medical: Not on file   Tobacco Use    Smoking status: Former Smoker    Smokeless tobacco: Never Used   Substance and Sexual Activity    Alcohol use:  Yes     Alcohol/week: 1.0 standard drinks     Types: 1 Standard drinks or equivalent per week    Drug use: No    Sexual activity: Yes     Partners: Male     Birth control/protection: None   Lifestyle    Physical activity     Days per week: Not on file     Minutes per session: Not on file    Stress: Not on file   Relationships    Social connections     Talks on phone: Not on file     Gets together: Not on file     Attends Quaker service: Not on file     Active member of club or organization: Not on file     Attends meetings of clubs or organizations: Not on file Relationship status: Not on file    Intimate partner violence     Fear of current or ex partner: Not on file     Emotionally abused: Not on file     Physically abused: Not on file     Forced sexual activity: Not on file   Other Topics Concern    Not on file   Social History Narrative    Not on file       Allergies   Allergen Reactions    Latex, Natural Rubber Swelling    Clindamycin Hives       Current Outpatient Medications   Medication Sig    PNV UF.94/MLYHVGV fum/folic ac (PRENATAL PO) Take  by mouth.  multivitamin (ONE A DAY) tablet Take 1 Tab by mouth daily. No current facility-administered medications for this visit.         Patient Active Problem List   Diagnosis Code    Breast mass, right N63.10    Mastodynia of right breast N64.4    Non-recurrent inguinal hernia of left side without obstruction or gangrene K40.90         Review of Systems - History obtained from the patient and patient filled out questionnaire   Constitutional/general, HEENT, CV, Resp, GI, MSK, Neuro, Psych, Heme/lymph, Skin, Breast ROS: no significant complaints except as noted on HPI    Physical Exam  Visit Vitals  /69   Wt 139 lb 3.2 oz (63.1 kg)   LMP 11/30/2020   BMI 25.46 kg/m²       Constitutional  · Appearance: well-nourished, well developed, alert, in no acute distress    HENT  · Head and Face: appears normal    Neck  · Inspection/Palpation: normal appearance, no masses or tenderness  · Lymph Nodes: no lymphadenopathy present  · Thyroid: gland size normal, nontender, no nodules or masses present on palpation    Chest  · Respiratory Effort: breathing unlabored  · Auscultation: normal breath sounds    Cardiovascular  · Heart:  · Auscultation: regular rate and rhythm without murmur    Breasts  · Inspection of Breasts: breasts symmetrical, no skin changes, no discharge present, nipple appearance normal, no skin retraction present  · Palpation of Breasts and Axillae: no masses present on palpation, no breast tenderness  · Axillary Lymph Nodes: no lymphadenopathy present    Gastrointestinal  · Abdominal Examination: abdomen non-tender to palpation, normal bowel sounds, no masses present  · Liver and spleen: no hepatomegaly present, spleen not palpable  · Hernias: no hernias identified    Genitourinary  · External Genitalia: normal appearance for age, no discharge present, no tenderness present, no inflammatory lesions present, no masses present  · Vagina: normal vaginal vault without central or paravaginal defects, scant discharge present, no inflammatory lesions present, no masses present  · Bladder: non-tender to palpation  · Urethra: appears normal  · Cervix: normal   · Uterus: normal size, shape and consistency  · Adnexa: no adnexal tenderness present, no adnexal masses present  · Perineum: perineum within normal limits, no evidence of trauma, no rashes or skin lesions present  · Anus: anus within normal limits, no hemorrhoids present  · Inguinal Lymph Nodes: no lymphadenopathy present    Skin  · General Inspection: no rash, no lesions identified    Neurologic/Psychiatric  · Mental Status:  · Orientation: grossly oriented to person, place and time  · Mood and Affect: mood normal, affect appropriate    Assessment:  28 y.o.  for well woman exam  Encounter Diagnoses   Name Primary?  Encounter for gynecological examination (general) (routine) without abnormal findings Yes    Screen for STD (sexually transmitted disease)     H/O abnormal cervical Papanicolaou smear        Plan:  The patient was counseled about diet, exercise, healthy lifestyle  We discussed current pap smear and HR HPV testing guidelines.    We recommend follow up one year for routine annual gynecologic exam or sooner prn  Handouts were given to the patient  We recommend follow up with a primary care physician for chronic medical problems and evaluation of non-gynecologic concerns and to please contact our office with any GYN questions or concerns. Std screening per pt request  Disc contraception options, pt declines. Folllow up:  [x] return for annual well woman exam in one year or sooner if she is having problems  [] follow up and ultrasound  [] 6 months  [] 3 months  [] 6 weeks   [] 1 month    Orders Placed This Encounter    HIV 1/2 AG/AB, 4TH GENERATION,W RFLX CONFIRM    HEP B SURFACE AG    RPR    PAP IG, CT-NG-TV, RFX APTIMA HPV ASCUS ([id number]) ([facility])       No results found for any visits on 12/08/20.

## 2020-12-08 NOTE — PATIENT INSTRUCTIONS
Well Visit, Ages 25 to 48: Care Instructions  Your Care Instructions     Physical exams can help you stay healthy. Your doctor has checked your overall health and may have suggested ways to take good care of yourself. He or she also may have recommended tests. At home, you can help prevent illness with healthy eating, regular exercise, and other steps. Follow-up care is a key part of your treatment and safety. Be sure to make and go to all appointments, and call your doctor if you are having problems. It's also a good idea to know your test results and keep a list of the medicines you take. How can you care for yourself at home? · Reach and stay at a healthy weight. This will lower your risk for many problems, such as obesity, diabetes, heart disease, and high blood pressure. · Get at least 30 minutes of physical activity on most days of the week. Walking is a good choice. You also may want to do other activities, such as running, swimming, cycling, or playing tennis or team sports. Discuss any changes in your exercise program with your doctor. · Do not smoke or allow others to smoke around you. If you need help quitting, talk to your doctor about stop-smoking programs and medicines. These can increase your chances of quitting for good. · Talk to your doctor about whether you have any risk factors for sexually transmitted infections (STIs). Having one sex partner (who does not have STIs and does not have sex with anyone else) is a good way to avoid these infections. · Use birth control if you do not want to have children at this time. Talk with your doctor about the choices available and what might be best for you. · Protect your skin from too much sun. When you're outdoors from 10 a.m. to 4 p.m., stay in the shade or cover up with clothing and a hat with a wide brim. Wear sunglasses that block UV rays. Even when it's cloudy, put broad-spectrum sunscreen (SPF 30 or higher) on any exposed skin.   · See a dentist one or two times a year for checkups and to have your teeth cleaned. · Wear a seat belt in the car. Follow your doctor's advice about when to have certain tests. These tests can spot problems early. For everyone  · Cholesterol. Have the fat (cholesterol) in your blood tested after age 21. Your doctor will tell you how often to have this done based on your age, family history, or other things that can increase your risk for heart disease. · Blood pressure. Have your blood pressure checked during a routine doctor visit. Your doctor will tell you how often to check your blood pressure based on your age, your blood pressure results, and other factors. · Vision. Talk with your doctor about how often to have a glaucoma test.  · Diabetes. Ask your doctor whether you should have tests for diabetes. · Colon cancer. Your risk for colorectal cancer gets higher as you get older. Some experts say that adults should start regular screening at age 48 and stop at age 76. Others say to start before age 48 or continue after age 76. Talk with your doctor about your risk and when to start and stop screening. For women  · Breast exam and mammogram. Talk to your doctor about when you should have a clinical breast exam and a mammogram. Medical experts differ on whether and how often women under 50 should have these tests. Your doctor can help you decide what is right for you. · Cervical cancer screening test and pelvic exam. Begin with a Pap test at age 24. The test often is part of a pelvic exam. Starting at age 27, you may choose to have a Pap test, an HPV test, or both tests at the same time (called co-testing). Talk with your doctor about how often to have testing. · Tests for sexually transmitted infections (STIs). Ask whether you should have tests for STIs. You may be at risk if you have sex with more than one person, especially if your partners do not wear condoms.   For men  · Tests for sexually transmitted infections (STIs). Ask whether you should have tests for STIs. You may be at risk if you have sex with more than one person, especially if you do not wear a condom. · Testicular cancer exam. Ask your doctor whether you should check your testicles regularly. · Prostate exam. Talk to your doctor about whether you should have a blood test (called a PSA test) for prostate cancer. Experts differ on whether and when men should have this test. Some experts suggest it if you are older than 39 and are -American or have a father or brother who got prostate cancer when he was younger than 72. When should you call for help? Watch closely for changes in your health, and be sure to contact your doctor if you have any problems or symptoms that concern you. Where can you learn more? Go to http://www.armstrong.com/  Enter P072 in the search box to learn more about \"Well Visit, Ages 25 to 48: Care Instructions. \"  Current as of: May 27, 2020               Content Version: 12.6  © 2006-2020 [employer]. Care instructions adapted under license by [employer] (which disclaims liability or warranty for this information). If you have questions about a medical condition or this instruction, always ask your healthcare professional. [place] [address] any warranty or liability for your use of this information.

## 2020-12-15 LAB
C TRACH RRNA CVX QL NAA+PROBE: NEGATIVE
CYTOLOGIST CVX/VAG CYTO: ABNORMAL
CYTOLOGY CVX/VAG DOC CYTO: ABNORMAL
CYTOLOGY CVX/VAG DOC THIN PREP: ABNORMAL
CYTOLOGY HISTORY:: ABNORMAL
DX ICD CODE: ABNORMAL
DX ICD CODE: ABNORMAL
LABCORP, 190119: ABNORMAL
Lab: ABNORMAL
N GONORRHOEA RRNA CVX QL NAA+PROBE: NEGATIVE
OTHER STN SPEC: ABNORMAL
PATHOLOGIST CVX/VAG CYTO: ABNORMAL
RECOM F/U CVX/VAG CYTO: ABNORMAL
STAT OF ADQ CVX/VAG CYTO-IMP: ABNORMAL
T VAGINALIS RRNA SPEC QL NAA+PROBE: NEGATIVE

## 2020-12-17 ENCOUNTER — TELEPHONE (OUTPATIENT)
Dept: OBGYN CLINIC | Age: 35
End: 2020-12-17

## 2020-12-17 NOTE — TELEPHONE ENCOUNTER
Called [facility] and spoke with Satish Drummond in cytology. Requested testing (HPV) has been added to pap with no additional diagnosis codes per provider. Currently awaiting fax & further results.

## 2020-12-17 NOTE — PROGRESS NOTES
Pap smear abnormal, recommend colposcopy. Update PMH: date/cytology/HPV (pos/neg) if done  Notify patient if MyChart not read or not active. Rec premedicate with 600mg Ibuprofen, if no contraindication (for example: pregnancy/allergy). TICKLE until colpo completed  Why wasn't HPV ordered, can we order it?

## 2020-12-17 NOTE — PROGRESS NOTES
Called [facility] and requested to have HPV added on. See telephone for additional details. Uncertain why HPV was not ordered. This was a verbal with read back - a cosign was necessary.

## 2020-12-17 NOTE — TELEPHONE ENCOUNTER
----- Message from Candido Solomon MD sent at 12/16/2020 11:36 PM EST -----  Pap smear abnormal, recommend colposcopy. Update PMH: date/cytology/HPV (pos/neg) if done  Notify patient if MyChart not read or not active. Rec premedicate with 600mg Ibuprofen, if no contraindication (for example: pregnancy/allergy). TICKLE until colpo completed  Why wasn't HPV ordered, can we order it?

## 2020-12-24 NOTE — PROGRESS NOTES
Called patient and advised patient with pap results. Updated PMH. Patient has verbalized understanding for colpo procedure, scheduled for 01/15/2021. Tickled until completed.

## 2021-01-08 LAB
HPV I/H RISK 1 DNA CVX QL PROBE+SIG AMP: POSITIVE
SPECIMEN STATUS REPORT, ROLRST: NORMAL

## 2021-01-11 NOTE — PROGRESS NOTES
Pap smear abnormal, recommend colposcopy. Update PMH: date/cytology/HPV (pos/neg) if done  Notify patient if MyChart not read or not active. Rec premedicate with 600mg Ibuprofen, if no contraindication (for example: pregnancy/allergy). TICKLE until colpo completed  Is she already in tickle? Has it been scheduled: is colpo 1/15?

## 2021-01-14 NOTE — PATIENT INSTRUCTIONS
Colposcopy: What to Expect at [facility] Your Recovery You may feel some soreness in your vagina for a day or two if you had a biopsy. Some vaginal bleeding or discharge is normal for up to a week after a biopsy. The discharge may be dark-colored if a solution was put on your cervix. You can use a sanitary pad for the bleeding. It may take a week or two for you to get the test results. This care sheet gives you a general idea about how long it will take for you to recover. But each person recovers at a different pace. Follow the steps below to feel better as quickly as possible. How can you care for yourself at home? Activity 
  · You can return to work and most daily activities right after the test.  
Exercise 
  · Do not exercise for 1 day after the test.  
Medicines 
  · Your doctor will tell you if and when you can restart your medicines. He or she will also give you instructions about taking any new medicines.  
  · If you take aspirin or some other blood thinner, ask your doctor if and when to start taking it again. Make sure that you understand exactly what your doctor wants you to do.  
  · Take an over-the-counter pain medicine, such as acetaminophen (Tylenol), ibuprofen (Advil, Motrin), or naproxen (Aleve). Be safe with medicines. Read and follow all instructions on the label. Do not take two or more pain medicines at the same time unless the doctor told you to. Many pain medicines have acetaminophen, which is Tylenol. Too much acetaminophen (Tylenol) can be harmful. Other instructions 
  · Use a pad if you have some bleeding.  
  · Do not douche, have sexual intercourse, or use tampons for 1 week if you had a biopsy.  This will allow time for your cervix to heal.  
  · You can take a bath or shower anytime after the test.  
 Follow-up care is a key part of your treatment and safety. Be sure to make and go to all appointments, and call your doctor if you are having problems. It's also a good idea to know your test results and keep a list of the medicines you take. When should you call for help? Call your doctor now or seek immediate medical care if: 
  · You have severe vaginal bleeding. This means that you are soaking through your usual pads or tampons each hour for 2 or more hours.  
  · You have pain that does not get better after you take pain medicine.  
  · You have signs of infection, such as: 
? Increased pain. ? Bad-smelling vaginal discharge. ? A fever. Watch closely for any changes in your health, and be sure to contact your doctor if: 
  · You have questions or concerns. Where can you learn more? Go to http://www.gray.com/ Enter M523 in the search box to learn more about \"Colposcopy: What to Expect at Home. \" Current as of: April 29, 2020               Content Version: 12.6 © 2006-2020 [employer]. Care instructions adapted under license by [employer] (which disclaims liability or warranty for this information). If you have questions about a medical condition or this instruction, always ask your healthcare professional. [address] any warranty or liability for your use of this information.

## 2021-01-15 ENCOUNTER — OFFICE VISIT (OUTPATIENT)
Dept: OBGYN CLINIC | Age: 36
End: 2021-01-15
Payer: COMMERCIAL

## 2021-01-15 VITALS
HEART RATE: 80 BPM | SYSTOLIC BLOOD PRESSURE: 126 MMHG | WEIGHT: 140 LBS | DIASTOLIC BLOOD PRESSURE: 70 MMHG | BODY MASS INDEX: 25.76 KG/M2 | HEIGHT: 62 IN

## 2021-01-15 DIAGNOSIS — R87.611 PAP SMEAR OF CERVIX WITH ASCUS, CANNOT EXCLUDE HGSIL: Primary | ICD-10-CM

## 2021-01-15 DIAGNOSIS — N89.8 VAGINAL DISCHARGE: ICD-10-CM

## 2021-01-15 LAB
HCG URINE, QL. (POC): NEGATIVE
VALID INTERNAL CONTROL?: YES

## 2021-01-15 PROCEDURE — 81025 URINE PREGNANCY TEST: CPT | Performed by: OBSTETRICS & GYNECOLOGY

## 2021-01-15 PROCEDURE — 99213 OFFICE O/P EST LOW 20 MIN: CPT | Performed by: OBSTETRICS & GYNECOLOGY

## 2021-01-15 PROCEDURE — 57454 BX/CURETT OF CERVIX W/SCOPE: CPT | Performed by: OBSTETRICS & GYNECOLOGY

## 2021-01-15 NOTE — PROGRESS NOTES
[address] [facility]  [email or web address]  [phone]    Navneet Berry MD, [address] [facility]       Ob/Gyn visit    Chief Complaint:   Chief Complaint   Patient presents with    Colposcopy     ATYPICAL SQUAMOUS CELLS, CANNOT EXCLUDE HIGH-GRADE SQUAMOUS        History of Present Illness: This is not a new problem being evaluated by this provider. Valeriano Alberto is a , 28 y.o. female [address]   She presents for an appointment for a pap smear abnormality consisting of EPITHELIAL CELL ABNORMALITY ATYPICAL SQUAMOUS CELLS, CANNOT EXCLUDE HIGH-GRADE SQUAMOUS   INTRAEPITHELIAL LESION (ASC-H). in 2020. Cervical cancer risk assessment:  Number of lifetime sexual partners: 48  Approximate age of initiation of sexual intercourse: 12years old  Tobacco use history: quit tobacco use 6 years  Current tobacco use: No  HPV vaccine history: No  Sexually transmitted disease exposure: Yes; Gonorrhea, Chlamydia, Herpes, & HPV, per pt. Currently taking PNV/folic acid: YES    LMP: No LMP recorded.     PFSH:  Past Medical History:   Diagnosis Date    Abnormal Pap smear 2019    HGSIL     1/15    Chlamydia     not since [facility] 10th grade    ANTHONY I (cervical intraepithelial neoplasia I) 2019    Encounter for insertion of mirena IUD 2/23/15    removal in 5 years   Creston Sicard Gonorrhea     last episode in [facility] 10 th grade age 12 tx received    Pap smear for cervical cancer screening 16    Neg,HPV Neg    Pap smear for cervical cancer screening 2020    ASCUS, HPV positive    Psychiatric problem     depression in [facility], currently seeing someone at [facility] 20     Past Surgical History:   Procedure Laterality Date    HX COLPOSCOPY  2019    ANTHONY I, benign    HX HERNIA REPAIR  10/02/2020    Cassia Hernandez MD    HX LIPECTOMY  2020    Dr. Cassia Hernandez     Family History   Problem Relation Age of Onset    Headache Mother    Creston Sicard Migraines Mother     Hypertension Mother     Arthritis-osteo Maternal Aunt     Alcohol abuse Maternal Grandfather     Cancer Paternal Grandmother     Breast Cancer Paternal Grandmother         31-43    Asthma Paternal Grandfather      Social History     Socioeconomic History    Marital status: SINGLE     Spouse name: Not on file    Number of children: Not on file    Years of education: Not on file    Highest education level: Not on file   Occupational History    Not on file   Social Needs    Financial resource strain: Not on file    Food insecurity     Worry: Not on file     Inability: Not on file    Transportation needs     Medical: Not on file     Non-medical: Not on file   Tobacco Use    Smoking status: Former Smoker    Smokeless tobacco: Never Used   Substance and Sexual Activity    Alcohol use:  Yes     Alcohol/week: 1.0 standard drinks     Types: 1 Standard drinks or equivalent per week    Drug use: No    Sexual activity: Yes     Partners: Female     Birth control/protection: None     Comment: female only partners   Lifestyle    Physical activity     Days per week: Not on file     Minutes per session: Not on file    Stress: Not on file   Relationships    Social connections     Talks on phone: Not on file     Gets together: Not on file     Attends Faith service: Not on file     Active member of club or organization: Not on file     Attends meetings of clubs or organizations: Not on file     Relationship status: Not on file    Intimate partner violence     Fear of current or ex partner: Not on file     Emotionally abused: Not on file     Physically abused: Not on file     Forced sexual activity: Not on file   Other Topics Concern    Not on file   Social History Narrative    Not on file       Allergies   Allergen Reactions    Latex, Natural Rubber Swelling    Clindamycin Hives     Current Outpatient Medications   Medication Sig    PNV AJ.69/JWSRRWL fum/folic ac (PRENATAL PO) Take by mouth.  multivitamin (ONE A DAY) tablet Take 1 Tab by mouth daily. No current facility-administered medications for this visit. Review of Systems:  History obtained from the patient  Constitutional: negative for fevers, chills and weight loss  ENT ROS: negative for - hearing change, oral lesions or visual changes  Respiratory: negative for cough, wheezing or dyspnea on exertion  Cardiovascular: negative for chest pain, irregular heart beats, exertional chest pressure/discomfort  Gastrointestinal: negative for dysphagia, nausea and vomiting  Genito-Urinary ROS: + VD, wants to be checked. Inteument/breast: negative for rash, breast lump and nipple discharge  Musculoskeletal:negative for stiff joints, neck pain and muscle weakness  Endocrine ROS: negative for - breast changes, galactorrhea or temperature intolerance  Hematological and Lymphatic ROS: negative for - blood clots, bruising or swollen lymph nodes    Physical Exam:  Visit Vitals  /70 (BP 1 Location: Right arm, BP Patient Position: Sitting)   Pulse 80   Ht 5' 2\" (1.575 m)   Wt 140 lb (63.5 kg)   BMI 25.61 kg/m²       GENERAL: alert, well appearing, and in no distress  HEAD: normocephalic, atraumatic. ABDOMEN: soft, nontender, nondistended, no masses or organomegaly   EGBUS: no lesions, no inflammation, no masses  VULVA: normal appearing vulva with no masses, tenderness or lesions  VAGINA: normal appearing vagina with normal color, no lesions, white and thin discharge  CERVIX: normal appearing cervix without discharge or lesions, non tender  UTERUS: uterus is normal size, shape, consistency and nontender   ADNEXA: normal adnexa in size, nontender and no masses  NEURO: alert, oriented, normal speech    Assessment:  Encounter Diagnoses   Name Primary?     Pap smear of cervix with ASCUS, cannot exclude HGSIL Yes    Vaginal discharge        Plan:  The patient is advised that she should contact the office if she does not note improvement or if symptoms recur  She should contact our office with any questions or concerns  She could keep her routine annual exam appointment. We reviewed her pap smear results and risk factors for cervical cancer. We discussed r/b/a of colposcopy and pt electec to proceed with procedure. After being presented with the risks, benefits and alternatives has she signed a consent for the procedure. She states that she understands the need for the procedure and has no further questions. She was informed that she may experience discomfort. Disc typical course of HPV/abnl pap  rec folic acid/PNV  Disc management based on results      Procedure Note: Colposcopy  Colposcopy procedure note:  Chart reviewed for the following:   Dayron TRIPLETT MD, have reviewed the History, Physical and updated the Allergic reactions for Jesus Ireland performed immediately prior to start of procedure:   Dayron TRIPLETT MD, have performed the following reviews on Rachna Harvey prior to the start of the procedure:            * Patient was identified by name and date of birth   * Agreement on procedure being performed was verified  * Risks and Benefits explained to the patient  * Procedure site verified and marked as necessary  * Patient was positioned for comfort  * Consent was signed and verified      Time: 12:08 PM    Date of procedure: 1/15/2021    Procedure performed by: Jaquelin Butler MD  Provider assisted by: Brynn Barrera CMA  Patient assisted by: self  How tolerated by patient: tolerated the procedure well with no complications  Comments: none    She was positioned in the dorsal lithotomy position and a speculum was inserted into the vagina. Dilute acetic acid was applied to the cervix. The colposcope was used to visualize the cervix with white and green light. The transformation zone was completely visualized. This colposcopy was satisfactory.   Findings:   The procedure was notable for white epithelium on the cervix. Biopsies were taken from the cervix . See accompanying image for biopsy sites. Monsels was applied to the cervix to obtain hemostasis. Endocervical currettage: An endocervical curettage was performed followed by a brush. Post Procedure Status: The patient tolerated the procedure well with minimal discomfort. She was observed for 10 minutes and released in good condition. She was given routine post-colposcopy instructions and handouts. She should notify us with any concerns, fevers or heavy bleeding. She was informed that she will be contacted with the pathology results and recommendation for follow-up.           Results for orders placed or performed in visit on 01/15/21   AMB POC URINE PREGNANCY TEST, VISUAL COLOR COMPARISON   Result Value Ref Range    VALID INTERNAL CONTROL POC Yes     HCG urine, Ql. (POC) Negative Negative

## 2021-01-21 LAB
A VAGINAE DNA VAG QL NAA+PROBE: ABNORMAL SCORE
BVAB2 DNA VAG QL NAA+PROBE: ABNORMAL SCORE
C ALBICANS DNA VAG QL NAA+PROBE: NEGATIVE
C GLABRATA DNA VAG QL NAA+PROBE: NEGATIVE
MEGA1 DNA VAG QL NAA+PROBE: ABNORMAL SCORE
SPECIMEN STATUS REPORT, ROLRST: NORMAL
T VAGINALIS DNA VAG QL NAA+PROBE: NEGATIVE

## 2021-01-22 LAB
CPT CODES, 490044: ABNORMAL
CPT DISCLAIMER: ABNORMAL
CYTOLOGY SPEC DOC CYTO: ABNORMAL
DIAGNOSIS SYNOPSIS:: ABNORMAL
DX ICD CODE: ABNORMAL
DX ICD CODE: ABNORMAL
PATH REPORT.GROSS SPEC: ABNORMAL
PATH REPORT.RELEVANT HX SPEC: ABNORMAL
PATHOLOGIST NAME: ABNORMAL
PDF IMAGE, 807507: ABNORMAL
SPECIMEN SOURCE: ABNORMAL

## 2021-01-26 ENCOUNTER — TELEPHONE (OUTPATIENT)
Dept: OBGYN CLINIC | Age: 36
End: 2021-01-26

## 2021-01-26 NOTE — TELEPHONE ENCOUNTER
Tickle for leep 2 months   rec heidi sena pt by phone 1/25/2021 1115 am   Disc rba of surgery and timing   Discussed risks, benefits and alternatives to procedure, including not performing it.    Discussed bleeding, infection, anesthesia risks, damage to internal organs; bladder/bowel/other internal organs, scarring, additional procedures if needed

## 2021-03-13 ENCOUNTER — HOSPITAL ENCOUNTER (OUTPATIENT)
Dept: PREADMISSION TESTING | Age: 36
Discharge: HOME | End: 2021-03-13

## 2021-03-14 ENCOUNTER — TRANSCRIBE ORDER (OUTPATIENT)
Dept: EMERGENCY DEPT | Age: 36
End: 2021-03-14

## 2021-03-14 ENCOUNTER — HOSPITAL ENCOUNTER (OUTPATIENT)
Dept: LAB | Age: 36
Discharge: HOME | End: 2021-03-14
Payer: COMMERCIAL

## 2021-03-14 DIAGNOSIS — Z01.812 PRE-PROCEDURAL LABORATORY EXAMINATIONS: Primary | ICD-10-CM

## 2021-03-14 DIAGNOSIS — Z01.812 PRE-PROCEDURAL LABORATORY EXAMINATIONS: ICD-10-CM

## 2021-03-14 PROCEDURE — U0003 INFECTIOUS AGENT DETECTION BY NUCLEIC ACID (DNA OR RNA); SEVERE ACUTE RESPIRATORY SYNDROME CORONAVIRUS 2 (SARS-COV-2) (CORONAVIRUS DISEASE [COVID-19]), AMPLIFIED PROBE TECHNIQUE, MAKING USE OF HIGH THROUGHPUT TECHNOLOGIES AS DESCRIBED BY CMS-2020-01-R: HCPCS

## 2021-03-15 LAB — SARS-COV-2, COV2NT: NOT DETECTED

## 2021-03-16 ENCOUNTER — ANESTHESIA EVENT (OUTPATIENT)
Dept: SURGERY | Age: 36
End: 2021-03-16
Payer: COMMERCIAL

## 2021-03-16 DIAGNOSIS — N87.1 CIN II (CERVICAL INTRAEPITHELIAL NEOPLASIA II): Primary | ICD-10-CM

## 2021-03-16 NOTE — PERIOP NOTES
Latex allergy called to surgical posting.   DOS: 3/17/2021 Consent: The patient's consent was obtained including but not limited to risks of crusting, scabbing, blistering, scarring, darker or lighter pigmentary change, recurrence, incomplete removal and infection. Detail Level: Detailed Post-Care Instructions: I reviewed with the patient in detail post-care instructions. Patient is to wear sunprotection, and avoid picking at any of the treated lesions. Pt may apply Vaseline to crusted or scabbing areas. Number Of Freeze-Thaw Cycles: 2 freeze-thaw cycles Duration Of Freeze Thaw-Cycle (Seconds): 10 Render Post-Care Instructions In Note?: yes

## 2021-03-16 NOTE — H&P
Gynecology History and Physical    Name: Forest Barron MRN: [id number] SSN: [id number]    YOB: 1985  Age: 28 y.o. Sex: female       Subjective:      Chief complaint:  Abnormal pap smear    Kavita Roblero is a 28 y.o. female with a history of abnormal pap smear : ascus cannot rule out HG 2020  Previous workup included colposcopy: ANTHONY 2 ecto, benign endo  Previous treatment measures included observation . She is admitted for Procedure(s) (LRB):  LOOP ELECTRODE EXCISION PROCEDURE OF CERVIX LEEP (N/A). Mammogram results:  Results from [place] encounter on 04/18/18   [facility] MAMMO BI DX INCL CAD    Narrative STUDY:  Bilateral Diagnostic Digital Mammography and right breast Ultrasound    INDICATION:  Right lateral breast firmness and pain from 9-12 o'clock. No focal  lump. VIEWS OBTAINED: Bilateral CC, MLO, right mL, right CC and MLO spots    COMPARISON:  None. Baseline. BREAST COMPOSITION: The breast tissue is heterogeneously dense, which could  obscure detection of small masses (approximately 51-75% glandular). FINDINGS:    Mammography:  Bilateral digital diagnostic mammography was performed, and is  interpreted in conjunction with a computer assisted detection (CAD) system. There is no mammographic abnormality to correspond to the area of firmness and  pain throughout the upper outer quadrant of the right breast. No suspicious  masses or microcalcifications are seen in either breast.     Ultrasound: Ultrasound was performed in the area of clinical concern  demonstrating normal dense fibroglandular tissue. No suspicious solid or cystic  mass. Impression IMPRESSION:    1. BI-RADS Assessment Category 1: Negative. Recommendations:  Recommend routine screening annual screening mammography starting at age 36  unless clinically indicated earlier. The patient has been notified of these results and recommendations.           Last pap smear:  Was abnormal.    The current method of family planning is none. OB History        3    Para   1    Term           1    AB   2    Living   1       SAB   1    TAB   1    Ectopic   0    Molar        Multiple   0    Live Births              Obstetric Comments   Menarche:  10. LMP: 3/26/18. # of Children:  2. Age at Delivery of First Child:  21.   Hysterectomy/oophorectomy:  NO/NO. Breast Bx:  no.  Hx of Breast Feeding:  yes. BCP:  yes. Hormone therapy:  no.                Past Medical History:   Diagnosis Date    Abnormal Pap smear 2019    HGSIL     1/15    Chlamydia     not since [facility] 10th grade    ANTHONY I (cervical intraepithelial neoplasia I) 2019    Encounter for insertion of mirena IUD 2/23/15    removal in 5 years   Floydene Ada Gonorrhea     last episode in [facility] 10 th grade age 12 tx received    Pap smear for cervical cancer screening 16    Neg,HPV Neg    Pap smear for cervical cancer screening 2020    ASCUS, HPV positive    Psychiatric problem     depression in [facility], currently seeing someone at [facility] one 20     Past Surgical History:   Procedure Laterality Date    HX COLPOSCOPY  2019    ANTHONY I, benign    HX COLPOSCOPY  01/15/2021    [address], ectocx:CIN2    HX HERNIA REPAIR  10/02/2020    Paulina Burgos MD    HX LIPECTOMY  2020    Dr. Vanessa Hubbard Not on file   Tobacco Use    Smoking status: Former Smoker    Smokeless tobacco: Never Used   Substance and Sexual Activity    Alcohol use:  Yes     Alcohol/week: 1.0 standard drinks     Types: 1 Standard drinks or equivalent per week    Drug use: No    Sexual activity: Yes     Partners: Female     Birth control/protection: None     Comment: female only partners     Family History   Problem Relation Age of Onset    Headache Mother     Migraines Mother     Hypertension Mother     Arthritis-osteo Maternal Aunt     Alcohol abuse Maternal Grandfather     Cancer Paternal Grandmother     Breast Cancer Paternal Grandmother         31-43    Asthma Paternal Grandfather      There are no active hospital problems to display for this patient. Allergies   Allergen Reactions    Latex, Natural Rubber Swelling    Clindamycin Hives     Prior to Admission medications    Medication Sig Start Date End Date Taking? Authorizing Provider   ascorbic acid, vitamin C, (ascorbic acid) 100 mg tab Take  by mouth. Unsure of dose   Yes Provider, Historical   PNV QI.00/CZADFAP fum/folic ac (PRENATAL PO) Take  by mouth. Provider, Historical   multivitamin (ONE A DAY) tablet Take 1 Tab by mouth daily. Provider, Historical        Review of Systems:  A comprehensive review of systems was negative except for that written in the History of Present Illness. Objective:     Vitals:    03/10/21 1231 21 0727   BP:  (!) 124/56   Pulse:  (!) 55   Resp:  15   Temp:  97.9 °F (36.6 °C)   SpO2:  98%   Weight: 140 lb (63.5 kg)    Height: 5' 2\" (1.575 m)        Physical Exam:  Patient without distress.   Heart: Regular rate and rhythm or S1S2 present  Lung: clear to auscultation throughout lung fields, no wheezes, no rales, no rhonchi and normal respiratory effort  Abdomen: soft, nontender  Extremities, lower: no c/t/e bilaterally  Pelvic exam: defferred to OR    Lab Results   Component Value Date/Time    WBC 6.6 2021 07:19 AM    RBC 3.86 2021 07:19 AM    HGB 12.3 2021 07:19 AM    HCT 36.2 2021 07:19 AM    PLATELET 763  07:19 AM         Assessment:   28 y.o.   ANTHONY 2, desires surgical management with LEEP       Past Medical History:   Diagnosis Date    Abnormal Pap smear 2019    HGSIL     1/15    Chlamydia     not since [facility] 10th grade    ANTHONY I (cervical intraepithelial neoplasia I) 2019    Encounter for insertion of mirena IUD 2/23/15    removal in 5 years   Sushant Loser Gonorrhea     [facility] 12 [facility]    Pap smear for cervical cancer screening 02/17/16    Neg,HPV Neg    Pap smear for cervical cancer screening 12/08/2020    ASCUS, HPV positive    [facility]20       Plan:     Procedure(s) (LRB):  LOOP ELECTRODE EXCISION PROCEDURE OF CERVIX LEEP (N/A)  Discussed the risks of surgery including the risks of bleeding, infection, deep vein thrombosis, and surgical injuries to internal organs including but not limited to the bowels, bladder, rectum, and female reproductive organs. The patient understands the risks; any and all questions were answered to the patient's satisfaction. Patient desires surgical intervention. All questions were answered.     Signed By:  Nav English MD     March 17, 2021

## 2021-03-17 ENCOUNTER — ANESTHESIA (OUTPATIENT)
Dept: SURGERY | Age: 36
End: 2021-03-17
Payer: COMMERCIAL

## 2021-03-17 ENCOUNTER — HOSPITAL ENCOUNTER (OUTPATIENT)
Age: 36
Setting detail: OUTPATIENT SURGERY
Discharge: HOME | End: 2021-03-17
Attending: OBSTETRICS & GYNECOLOGY | Admitting: OBSTETRICS & GYNECOLOGY
Payer: COMMERCIAL

## 2021-03-17 VITALS
RESPIRATION RATE: 16 BRPM | BODY MASS INDEX: 25.76 KG/M2 | WEIGHT: 140 LBS | DIASTOLIC BLOOD PRESSURE: 63 MMHG | OXYGEN SATURATION: 100 % | HEIGHT: 62 IN | HEART RATE: 63 BPM | TEMPERATURE: 98.2 F | SYSTOLIC BLOOD PRESSURE: 122 MMHG

## 2021-03-17 DIAGNOSIS — N87.1 CIN II (CERVICAL INTRAEPITHELIAL NEOPLASIA II): ICD-10-CM

## 2021-03-17 LAB
ERYTHROCYTE [DISTWIDTH] IN BLOOD BY AUTOMATED COUNT: 11.5 % (ref 11.5–14.5)
HCG UR QL: NEGATIVE
HCT VFR BLD AUTO: 36.2 % (ref 35–47)
HGB BLD-MCNC: 12.3 G/DL (ref 11.5–16)
MCH RBC QN AUTO: 31.9 PG (ref 26–34)
MCHC RBC AUTO-ENTMCNC: 34 G/DL (ref 30–36.5)
MCV RBC AUTO: 93.8 FL (ref 80–99)
NRBC # BLD: 0 K/UL (ref 0–0.01)
NRBC BLD-RTO: 0 PER 100 WBC
PLATELET # BLD AUTO: 242 K/UL (ref 150–400)
PMV BLD AUTO: 10.1 FL (ref 8.9–12.9)
RBC # BLD AUTO: 3.86 M/UL (ref 3.8–5.2)
WBC # BLD AUTO: 6.6 K/UL (ref 3.6–11)

## 2021-03-17 PROCEDURE — 77030003666 HC NDL SPINAL BD -A: Performed by: OBSTETRICS & GYNECOLOGY

## 2021-03-17 PROCEDURE — 77030007304 HC ELECTRD LP RND MEGA -A: Performed by: OBSTETRICS & GYNECOLOGY

## 2021-03-17 PROCEDURE — 77030018722 HC ELCTRD BALL LEEP UTMD -B: Performed by: OBSTETRICS & GYNECOLOGY

## 2021-03-17 PROCEDURE — 74011000250 HC RX REV CODE- 250: Performed by: OBSTETRICS & GYNECOLOGY

## 2021-03-17 PROCEDURE — 88305 TISSUE EXAM BY PATHOLOGIST: CPT

## 2021-03-17 PROCEDURE — 74011250636 HC RX REV CODE- 250/636: Performed by: NURSE ANESTHETIST, CERTIFIED REGISTERED

## 2021-03-17 PROCEDURE — 77030040922 HC BLNKT HYPOTHRM STRY -A

## 2021-03-17 PROCEDURE — 77030002996 HC SUT SLK J&J -A: Performed by: OBSTETRICS & GYNECOLOGY

## 2021-03-17 PROCEDURE — 88307 TISSUE EXAM BY PATHOLOGIST: CPT

## 2021-03-17 PROCEDURE — 76060000032 HC ANESTHESIA 0.5 TO 1 HR: Performed by: OBSTETRICS & GYNECOLOGY

## 2021-03-17 PROCEDURE — 76210000006 HC OR PH I REC 0.5 TO 1 HR: Performed by: OBSTETRICS & GYNECOLOGY

## 2021-03-17 PROCEDURE — 85027 COMPLETE CBC AUTOMATED: CPT

## 2021-03-17 PROCEDURE — 74011250637 HC RX REV CODE- 250/637: Performed by: NURSE ANESTHETIST, CERTIFIED REGISTERED

## 2021-03-17 PROCEDURE — 36415 COLL VENOUS BLD VENIPUNCTURE: CPT

## 2021-03-17 PROCEDURE — 81025 URINE PREGNANCY TEST: CPT

## 2021-03-17 PROCEDURE — 76010000138 HC OR TIME 0.5 TO 1 HR: Performed by: OBSTETRICS & GYNECOLOGY

## 2021-03-17 PROCEDURE — 74011250636 HC RX REV CODE- 250/636: Performed by: ANESTHESIOLOGY

## 2021-03-17 PROCEDURE — 2709999900 HC NON-CHARGEABLE SUPPLY: Performed by: OBSTETRICS & GYNECOLOGY

## 2021-03-17 PROCEDURE — 77030020143 HC AIRWY LARYN INTUB CGAS -A: Performed by: ANESTHESIOLOGY

## 2021-03-17 PROCEDURE — 57461 CONZ OF CERVIX W/SCOPE LEEP: CPT | Performed by: OBSTETRICS & GYNECOLOGY

## 2021-03-17 PROCEDURE — 74011000272 HC RX REV CODE- 272: Performed by: OBSTETRICS & GYNECOLOGY

## 2021-03-17 PROCEDURE — 74011000250 HC RX REV CODE- 250: Performed by: NURSE ANESTHETIST, CERTIFIED REGISTERED

## 2021-03-17 PROCEDURE — 76210000020 HC REC RM PH II FIRST 0.5 HR: Performed by: OBSTETRICS & GYNECOLOGY

## 2021-03-17 RX ORDER — FERRIC SUBSULFATE 20-22G/100
SOLUTION, NON-ORAL MISCELLANEOUS AS NEEDED
Status: DISCONTINUED | OUTPATIENT
Start: 2021-03-17 | End: 2021-03-17 | Stop reason: HOSPADM

## 2021-03-17 RX ORDER — IBUPROFEN 600 MG/1
600 TABLET ORAL
Qty: 30 TAB | Refills: 0 | Status: SHIPPED | OUTPATIENT
Start: 2021-03-17 | End: 2021-05-10

## 2021-03-17 RX ORDER — SODIUM CHLORIDE, SODIUM LACTATE, POTASSIUM CHLORIDE, CALCIUM CHLORIDE 600; 310; 30; 20 MG/100ML; MG/100ML; MG/100ML; MG/100ML
125 INJECTION, SOLUTION INTRAVENOUS CONTINUOUS
Status: DISCONTINUED | OUTPATIENT
Start: 2021-03-17 | End: 2021-03-17 | Stop reason: HOSPADM

## 2021-03-17 RX ORDER — ACETIC ACID 5 %
LIQUID (ML) MISCELLANEOUS AS NEEDED
Status: DISCONTINUED | OUTPATIENT
Start: 2021-03-17 | End: 2021-03-17 | Stop reason: HOSPADM

## 2021-03-17 RX ORDER — SODIUM CHLORIDE 0.9 % (FLUSH) 0.9 %
5-40 SYRINGE (ML) INJECTION EVERY 8 HOURS
Status: DISCONTINUED | OUTPATIENT
Start: 2021-03-17 | End: 2021-03-17 | Stop reason: HOSPADM

## 2021-03-17 RX ORDER — MIDAZOLAM HYDROCHLORIDE 1 MG/ML
INJECTION, SOLUTION INTRAMUSCULAR; INTRAVENOUS AS NEEDED
Status: DISCONTINUED | OUTPATIENT
Start: 2021-03-17 | End: 2021-03-17 | Stop reason: HOSPADM

## 2021-03-17 RX ORDER — PROPOFOL 10 MG/ML
INJECTION, EMULSION INTRAVENOUS AS NEEDED
Status: DISCONTINUED | OUTPATIENT
Start: 2021-03-17 | End: 2021-03-17 | Stop reason: HOSPADM

## 2021-03-17 RX ORDER — SCOLOPAMINE TRANSDERMAL SYSTEM 1 MG/1
1 PATCH, EXTENDED RELEASE TRANSDERMAL
Status: DISCONTINUED | OUTPATIENT
Start: 2021-03-17 | End: 2021-03-17

## 2021-03-17 RX ORDER — ONDANSETRON 2 MG/ML
4 INJECTION INTRAMUSCULAR; INTRAVENOUS AS NEEDED
Status: DISCONTINUED | OUTPATIENT
Start: 2021-03-17 | End: 2021-03-17 | Stop reason: HOSPADM

## 2021-03-17 RX ORDER — NALOXONE HYDROCHLORIDE 0.4 MG/ML
0.2 INJECTION, SOLUTION INTRAMUSCULAR; INTRAVENOUS; SUBCUTANEOUS
Status: DISCONTINUED | OUTPATIENT
Start: 2021-03-17 | End: 2021-03-17 | Stop reason: HOSPADM

## 2021-03-17 RX ORDER — LIDOCAINE HYDROCHLORIDE 20 MG/ML
INJECTION, SOLUTION EPIDURAL; INFILTRATION; INTRACAUDAL; PERINEURAL AS NEEDED
Status: DISCONTINUED | OUTPATIENT
Start: 2021-03-17 | End: 2021-03-17 | Stop reason: HOSPADM

## 2021-03-17 RX ORDER — KETOROLAC TROMETHAMINE 30 MG/ML
INJECTION, SOLUTION INTRAMUSCULAR; INTRAVENOUS AS NEEDED
Status: DISCONTINUED | OUTPATIENT
Start: 2021-03-17 | End: 2021-03-17 | Stop reason: HOSPADM

## 2021-03-17 RX ORDER — LIDOCAINE HYDROCHLORIDE 10 MG/ML
0.1 INJECTION, SOLUTION EPIDURAL; INFILTRATION; INTRACAUDAL; PERINEURAL AS NEEDED
Status: DISCONTINUED | OUTPATIENT
Start: 2021-03-17 | End: 2021-03-17 | Stop reason: HOSPADM

## 2021-03-17 RX ORDER — FENTANYL CITRATE 50 UG/ML
INJECTION, SOLUTION INTRAMUSCULAR; INTRAVENOUS AS NEEDED
Status: DISCONTINUED | OUTPATIENT
Start: 2021-03-17 | End: 2021-03-17 | Stop reason: HOSPADM

## 2021-03-17 RX ORDER — DEXAMETHASONE SODIUM PHOSPHATE 4 MG/ML
INJECTION, SOLUTION INTRA-ARTICULAR; INTRALESIONAL; INTRAMUSCULAR; INTRAVENOUS; SOFT TISSUE AS NEEDED
Status: DISCONTINUED | OUTPATIENT
Start: 2021-03-17 | End: 2021-03-17 | Stop reason: HOSPADM

## 2021-03-17 RX ORDER — SCOLOPAMINE TRANSDERMAL SYSTEM 1 MG/1
1 PATCH, EXTENDED RELEASE TRANSDERMAL
Status: DISCONTINUED | OUTPATIENT
Start: 2021-03-17 | End: 2021-03-17 | Stop reason: HOSPADM

## 2021-03-17 RX ORDER — LIDOCAINE HYDROCHLORIDE AND EPINEPHRINE 10; 10 MG/ML; UG/ML
INJECTION, SOLUTION INFILTRATION; PERINEURAL AS NEEDED
Status: DISCONTINUED | OUTPATIENT
Start: 2021-03-17 | End: 2021-03-17 | Stop reason: HOSPADM

## 2021-03-17 RX ORDER — HYDROMORPHONE HYDROCHLORIDE 1 MG/ML
.5-1 INJECTION, SOLUTION INTRAMUSCULAR; INTRAVENOUS; SUBCUTANEOUS
Status: DISCONTINUED | OUTPATIENT
Start: 2021-03-17 | End: 2021-03-17 | Stop reason: HOSPADM

## 2021-03-17 RX ORDER — SODIUM CHLORIDE, SODIUM LACTATE, POTASSIUM CHLORIDE, CALCIUM CHLORIDE 600; 310; 30; 20 MG/100ML; MG/100ML; MG/100ML; MG/100ML
100 INJECTION, SOLUTION INTRAVENOUS CONTINUOUS
Status: DISCONTINUED | OUTPATIENT
Start: 2021-03-17 | End: 2021-03-17 | Stop reason: HOSPADM

## 2021-03-17 RX ORDER — SODIUM CHLORIDE 0.9 % (FLUSH) 0.9 %
5-40 SYRINGE (ML) INJECTION AS NEEDED
Status: DISCONTINUED | OUTPATIENT
Start: 2021-03-17 | End: 2021-03-17 | Stop reason: HOSPADM

## 2021-03-17 RX ORDER — ONDANSETRON 2 MG/ML
INJECTION INTRAMUSCULAR; INTRAVENOUS AS NEEDED
Status: DISCONTINUED | OUTPATIENT
Start: 2021-03-17 | End: 2021-03-17 | Stop reason: HOSPADM

## 2021-03-17 RX ORDER — FLUMAZENIL 0.1 MG/ML
0.2 INJECTION INTRAVENOUS
Status: DISCONTINUED | OUTPATIENT
Start: 2021-03-17 | End: 2021-03-17 | Stop reason: HOSPADM

## 2021-03-17 RX ADMIN — PROPOFOL 40 MG: 10 INJECTION, EMULSION INTRAVENOUS at 08:55

## 2021-03-17 RX ADMIN — FENTANYL CITRATE 50 MCG: 0.05 INJECTION, SOLUTION INTRAMUSCULAR; INTRAVENOUS at 08:44

## 2021-03-17 RX ADMIN — FENTANYL CITRATE 50 MCG: 0.05 INJECTION, SOLUTION INTRAMUSCULAR; INTRAVENOUS at 08:36

## 2021-03-17 RX ADMIN — KETOROLAC TROMETHAMINE 30 MG: 30 INJECTION INTRAMUSCULAR; INTRAVENOUS at 09:19

## 2021-03-17 RX ADMIN — DEXAMETHASONE SODIUM PHOSPHATE 8 MG: 4 INJECTION, SOLUTION INTRAMUSCULAR; INTRAVENOUS at 08:47

## 2021-03-17 RX ADMIN — LIDOCAINE HYDROCHLORIDE 40 MG: 20 INJECTION, SOLUTION EPIDURAL; INFILTRATION; INTRACAUDAL; PERINEURAL at 08:34

## 2021-03-17 RX ADMIN — PROPOFOL 130 MG: 10 INJECTION, EMULSION INTRAVENOUS at 08:34

## 2021-03-17 RX ADMIN — ONDANSETRON HYDROCHLORIDE 4 MG: 2 SOLUTION INTRAMUSCULAR; INTRAVENOUS at 08:47

## 2021-03-17 RX ADMIN — MIDAZOLAM HYDROCHLORIDE 2 MG: 2 INJECTION, SOLUTION INTRAMUSCULAR; INTRAVENOUS at 08:27

## 2021-03-17 RX ADMIN — SODIUM CHLORIDE, POTASSIUM CHLORIDE, SODIUM LACTATE AND CALCIUM CHLORIDE 125 ML/HR: 600; 310; 30; 20 INJECTION, SOLUTION INTRAVENOUS at 07:29

## 2021-03-17 NOTE — OP NOTES
LEEP procedure      Patient:  Chris Daniel  DATE OF PROCEDURE:  3/17/2021    PREOPERATIVE DIAGNOSIS:    Encounter Diagnoses     ICD-10-CM ICD-9-CM   1. ANTHONY II (cervical intraepithelial neoplasia II)  N87.1 622.12     POSTOPERATIVE DIAGNOSIS:  same    PROCEDURE:  LEEP    SURGEON:  Elmer Bridges MD    ASSISTANT:  OR staff    ANESTHESIA: General endotracheal anesthesia    EBL: less than 50 ml    FINDINGS: circumferential non lugol staining area    Specimen:  Ectocervical LEEP x2, endocervical LEEP     Complications: none    PROCEDURE: The patient was placed on the operating table in the supine position. Patient was placed under anesthesia. She was prepped and draped in the usual fashion for vaginal surgery and her bladder was emptied with a straight catheterization. Time out was done to confirm the operating procedure, surgeon, patient and site. A bimanual exam revealed a small mid position uterus and closed cervix. The patient was placed in the dorsal lithotomy position and a grounding pad was placed on the patient's right thigh and connected to the Bovie device. 5% Acetic Acid  Lugols Sol  Monsels Sol  Swabs (Large)  Colposcope: positioned, plugged in, and on standby The speculum exam revealed a normal appearing cervix. There was no bleeding or discharge present. A LEEP coated speculum was placed into the vagina and the cervix was again visualized colposcopically. Colposcopy was repeated and there was a lesion visualized circumferentially by non lugol staining. The cervix was injected with 8 cc's of 1% Lidocaine w/epinephrine. A satisfactory amount of time was given for the anesthetic to take effect. The patient tolerated the injections well with no untoward effects. Following this,  The LEEP generator was set at 50 narvaez cut and 30 narvaez coag. A 8x20mm loop was then used to take an ectocervical specimen.  An separate endocervical specimen was also taken, a small polyp vs myoma was removed during the endocervical LEEP and sent with the endo LEEP specimen. An ECC was not performed. The ball electrode was used to obtain hemostasis. Monsels solution was not applied. The speculum was removed. The specimen was labeled, placed in formalin, and sent to the pathologist. The superior ectocervical LEEP was labeled with a long-short suture at 12 o'clock, the inferior ectocervical LEEP was labeted with a short-short suture at six oclock. The patient tolerated the LEEP procedure well. She was discharged from the office in stable condition. All counts were correct, patient was taken to the recovery area in stable condition.   Abdiaziz Fournier MD

## 2021-03-17 NOTE — BRIEF OP NOTE
Brief Postoperative Note    Patient: Ruba Mendes  YOB: 1985  MRN: [id number]    Date of Procedure: 3/17/2021     Pre-Op Diagnosis: ANTHONY II    Post-Op Diagnosis: Same as preoperative diagnosis.       Procedure(s):  LOOP ELECTRODE EXCISION PROCEDURE OF CERVIX LEEP    Surgeon(s):  Anni Nicole MD    Surgical Assistant: None    Anesthesia: MAC     Estimated Blood Loss (mL): less than 50     Complications: None    Specimens: * No specimens in log *   ecto leep  Endo leep     Implants: * No implants in log *    Drains: * No LDAs found *    Findings: non lugol staining area circumfrential     Electronically Signed by Bjorn Carranza MD on 3/17/2021 at 7:50 AM

## 2021-03-17 NOTE — DISCHARGE INSTRUCTIONS
[facility]  [email or web address]  [phone]    Sharmaine Katz MD, FACOG                POSTOPERATIVE INSTRUCTION  FOR CERVICAL LEEP    Upon discharge from the hospital, there are a few things to consider:       1. Absolutely no intercourse, douching or tampon use until cleared by your doctor. This precaution helps reduce complication of infection. 2. If you have a vaginal pack in, please remove this the morning after surgery. You do not have packing. 3. You may expect some bleeding and bloody vaginal discharge for 2-6 weeks   following surgery. This will gradually turn yellow. 4. You may return to normal everyday activities in 1-2 days. No strenuous activities, such as high impact exercise or heavy lifting until cleared by your doctor. 5. Notify us if you experience:     a.  heavy vaginal bleeding (soaking one pad per hour)    b.  temperature of 101°  or greater       c.  severe pelvic or vaginal pain    d.  foul odor      6. Please call the office today at [phone] to schedule your checkup appointment or send a request through My Chart   in 4 weeks. Above all, please notify us of a problem if it arises before we see you again. In an emergency, you may contact a doctor 24 hours a day at [phone]. Additional Discharge Instructions     Please read all of your discharge instructions   Follow all of your medication instructions carefully   Call our office on the next business day to schedule your follow-up appointment   If you have any questions or concerns, please contact us at [phone] or if the situation is urgent contact [phone]   Become a [facility] My Chart user so you can access information, results and appointments: go to [email or web address]. The [place] [address] is to bring compassion to healthcare and to be good help to those in need.  We aim at providing quality healthcare with an emphasis on respect, justice, compassion, stewardship, integrity, growth and innovation.  If you did not receive excellent communication, compassionate care and an outstanding patient experience, please notify Urszula Delvalle at [email or web address] or [phone] or discuss your concerns with me at your next visit so that we can always meet our mission and your expectations    Erica Hinkle MD  [address] [place] [address], [address]  [email or web address]   [phone]   Good Help to Those in [address] [facility] [address] from your Nurse      PATIENT INSTRUCTIONS    After general anesthesia or intravenous sedation, for 24 hours or while taking prescription Narcotics:  · Limit your activities  · Do not drive and operate hazardous machinery  · Do not make important personal or business decisions  · Do  not drink alcoholic beverages  · If you have not urinated within 8 hours after discharge, please contact your surgeon on call. Report the following to your surgeon:  · Excessive pain, swelling, redness or odor of or around the surgical area  · Temperature over 100.5  · Nausea and vomiting lasting longer than 4 hours or if unable to take medications  · Any signs of decreased circulation or nerve impairment to extremity: change in color, persistent  numbness, tingling, coldness or increase pain  · Any questions      GOOD HELP TO FIGHT AN INFECTION  Here are a few tip to help reduce the chance of getting an infection after surgery:   Wash Your Hands   Good handwashing is the most important thing you and your caregiver can do.  Wash before and after caring for any wounds. Dry your hand with a clean towel.  Wash with soap and water for at least 20 seconds. A TIP: sing the \"Happy Birthday\" song through one time while washing to help with the timing.  Use a hand  in between washings.      Shower   When your surgeon says it is OK to take a shower, use a new bar of antibacterial soap (if that is what you use, and keep that bar of soap ONLY for your use), or antibacterial body wash.  Use a clean wash cloth or sponge when you bathe.  Dry off with a clean towel  after every bath - be careful around any wounds, skin staples, sutures or surgical glue over/on wounds.  Do not enter swimming pools, hot tubs, lakes, rivers and/or ocean until wounds are healed and your doctor/surgeon says it is OK.  Use Clean Sheets   Sleep on freshly laundered sheets after your surgery.  Keep the surgery site covered with a clean, dry bandage (if instructed to do so). If the bandage becomes soiled, reapply a new, dry, clean bandage.  Do not allow pets to sleep with you while your wound is healing.  Lifestyle Modification and Controlling Your Blood Sugar   Smoking slows wound healing. Stop smoking and limit exposure to second-hand smoke.  High blood sugar slows wound healing. Eat a well-balanced diet to provide proper nutrition while healing   Monitor your blood sugar (if you are a diabetic) and take your medications as you are suppose to so you can control you blood sugar after surgery. COUGH AND DEEP BREATHE    Breathing deeply and coughing are very important exercises to do after surgery. Deep breathing and coughing open the little air tubes and air sacks in your lungs. You take deep breaths every day. You may not even notice - it is just something you do when you sigh or yawn. It is a natural exercise you do to keep these air passages open. After surgery, take deep breaths and cough, on purpose. DIRECTIONS:  · Take 10 to 15 slow deep breaths every hour while awake. · Breathe in deeply, and hold it for 2 seconds. · Exhale slowly through puckered lips, like blowing up a balloon. · After every 4th or 5th deep breath, hug your pillow to your chest or belly and give a hard, deep cough.       Yes, it will probably hurt. But doing this exercise is a very important part of healing after surgery. Take your pain medicine to help you do this exercise without too much pain. Coughing and deep breathing help prevent bronchitis and pneumonia after surgery. If you had chest or belly surgery, use a pillow as a \"hug sena\" and hold it tightly to your chest or belly when you cough. ANKLE PUMPS    Ankle pumps increase the circulation of oxygenated blood to your lower extremities and decrease your risk for circulation problems such as blood clots. They also stretch the muscles, tendons and ligaments in your foot and ankle, and prevent joint contracture in the ankle and foot, especially after surgeries on the legs. It is important to do ankle pump exercises regularly after surgery because immobility increases your risk for developing a blood clot. Your doctor may also have you take an Aspirin for the next few days as well. If your doctor did not ask you to take an Aspirin, consult with him before starting Aspirin therapy on your own. The exercise is quite simple. · Slowly point your foot forward, feeling the muscles on the top of your lower leg stretch, and hold this position for 5 seconds. · Next, pull your foot back toward you as far as possible, stretching the calf muscles, and hold that position for 5 seconds. · Repeat with the other foot. · Perform 10 repetitions every hour while awake for both ankles if possible (down and then up with the foot once is one repetition). You should feel gentle stretching of the muscles in your lower leg when doing this exercise. If you feel pain, or your range of motion is limited, don't push too hard. Only go the limit your joint and muscles will let you go. If you have increasing pain, progressively worsening leg warmth or swelling, STOP the exercise and call your doctor.            MEDICATION AND   SIDE EFFECT GUIDE    The [employer] [facility] MEDICATION AND SIDE EFFECT GUIDE was provided to the PATIENT AND CARE PROVIDER. Information provided includes instruction about drug purpose and common side effects for the following medications:   · No prescriptions given        These are general instructions for a healthy lifestyle:    *   Please give a list of your current medications to your Primary Care Provider. *   Please update this list whenever your medications are discontinued, doses are changed, or new medications (including over-the-counter products) are added. *   Please carry medication information at all times in case of emergency situations. About Smoking  No smoking / No tobacco products  Avoid exposure to second hand smoke     Surgeon General's Warning:  Quitting smoking now greatly reduces serious risk to your health. Obesity, smoking, and sedentary lifestyle greatly increases your risk for illness and disease. A healthy diet, regular physical exercise & weight monitoring are important for maintaining a healthy lifestyle. Congestive Heart Failure  You may be retaining fluid if you have a history of heart failure or if you experience any of the following symptoms:  Weight gain of 3 pounds or more overnight or 5 pounds in a week, increased swelling in your hands or feet or shortness of breath while lying flat in bed. Please call your doctor as soon as you notice any of these symptoms; do not wait until your next office visit. Recognize signs and symptoms of STROKE:  F -  Face looks uneven  A -  Arms unable to move or move evenly  S -  Speech slurred or non-existent  T -  Time-call 911 as soon as signs and symptoms begin-DO NOT go          back to bed or wait to see if you get better-TIME IS BRAIN. Warning Signs of HEART ATTACK   Call 911 if you have these symptoms:     Chest discomfort.  Most heart attacks involve discomfort in the center of the chest that lasts more than a few minutes, or that goes away and comes back. It can feel like uncomfortable pressure, squeezing, fullness, or pain.  Discomfort in other areas of the upper body. Symptoms can include pain or discomfort in one or both arms, the back, neck, jaw, or stomach.  Shortness of breath with or without chest discomfort.  Other signs may include breaking out in a cold sweat, nausea, or lightheadedness. Don't wait more than five minutes to call 911 - MINUTES MATTER! Fast action can save your life. Calling 911 is almost always the fastest way to get lifesaving treatment. Emergency Medical Services staff can begin treatment when they arrive -- up to an hour sooner than if someone gets to the hospital by car. Learning About Coronavirus [phone])  Coronavirus [phone]): Overview  What is coronavirus (COVID-19)? The coronavirus disease (COVID-19) is caused by a virus. It is an illness that was first found in [place], [place], in December 2019. It has since spread worldwide. The virus can cause fever, cough, and trouble breathing. In severe cases, it can cause pneumonia and make it hard to breathe without help. It can cause death. Coronaviruses are a large group of viruses. They cause the common cold. They also cause more serious illnesses like Middle East respiratory syndrome (MERS) and severe acute respiratory syndrome (SARS). COVID-19 is caused by a novel coronavirus. That means it's a new type that has not been seen in people before. This virus spreads person-to-person through droplets from coughing and sneezing. It can also spread when you are close to someone who is infected. And it can spread when you touch something that has the virus on it, such as a doorknob or a tabletop. What can you do to protect yourself from coronavirus (COVID-19)? The best way to protect yourself from getting sick is to:  · Avoid areas where there is an outbreak. · Avoid contact with people who may be infected.   · Wash your hands often with soap or alcohol-based hand sanitizers. · Avoid crowds and try to stay at least 6 feet away from other people. · Wash your hands often, especially after you cough or sneeze. Use soap and water, and scrub for at least 20 seconds. If soap and water aren't available, use an alcohol-based hand . · Avoid touching your mouth, nose, and eyes. What can you do to avoid spreading the virus to others? To help avoid spreading the virus to others:  · Cover your mouth with a tissue when you cough or sneeze. Then throw the tissue in the trash. · Use a disinfectant to clean things that you touch often. · Stay home if you are sick or have been exposed to the virus. Don't go to school, work, or public areas. And don't use public transportation. · If you are sick:  ? Leave your home only if you need to get medical care. But call the doctor's office first so they know you're coming. And wear a face mask, if you have one.  ? If you have a face mask, wear it whenever you're around other people. It can help stop the spread of the virus when you cough or sneeze. ? Clean and disinfect your home every day. Use household  and disinfectant wipes or sprays. Take special care to clean things that you grab with your hands. These include doorknobs, remote controls, phones, and handles on your refrigerator and microwave. And don't forget countertops, tabletops, bathrooms, and computer keyboards. When to call for help  Call 911 anytime you think you may need emergency care. For example, call if:  · You have severe trouble breathing. (You can't talk at all.)  · You have constant chest pain or pressure. · You are severely dizzy or lightheaded. · You are confused or can't think clearly. · Your face and lips have a blue color. · You pass out (lose consciousness) or are very hard to wake up. Call your doctor now if you develop symptoms such as:  · Shortness of breath. · Fever. · Cough.   If you need to get care, call ahead to the doctor's office for instructions before you go. Make sure you wear a face mask, if you have one, to prevent exposing other people to the virus. Where can you get the latest information? The following health organizations are tracking and studying this virus. Their websites contain the most up-to-date information. Annette Mendes also learn what to do if you think you may have been exposed to the virus. · U.S. Centers for Disease Control and Prevention (CDC): The CDC provides updated news about the disease and travel advice. The website also tells you how to prevent the spread of infection. www.cdc.gov  · World Health Organization [facility]): WHO offers information about the virus outbreaks. WHO also has travel advice. www.who.int  Current as of: April 1, 2020               Content Version: 12.4  © 5421-7696 Healthwise, Incorporated. Care instructions adapted under license by your healthcare professional. If you have questions about a medical condition or this instruction, always ask your healthcare professional. [address] any warranty or liability for your use of this information.           CONTENTS FOUND IN YOUR DISCHARGE ENVELOPE:  [x]     Surgeon and Hospital Discharge Instructions  [x]     [facility] Surgical Services Care Provider Card  []     Medication & Side Effect Guide            (your newly prescribed medications have been marked/highlighted showing the most common side effects from the classes of drugs on your prescriptions)  []     Medication Prescription(s) x *** ( [] These have been sent electronically to your pharmacy by your surgeon,   - OR -       your surgeon has already provided these to you during a previous/pre-op office visit)  []     Physical Therapy Prescription  []     Follow-up Appointment Cards  []     Surgery-related Pictures/Media  []     Pain block and/or block with On-Q Catheter from Anesthesia Service (information included in your instructions above)  []     Medical device information sheets/pamphlets from their    []     School/work excuse note. []     /parent work excuse note. The following personal items collected during your admission are returned to you:   Dental Appliance: Dental Appliances: None  Vision: Visual Aid: Glasses  Hearing Aid:    Jewelry: Jewelry: Earrings(gertrude Weber consent signed)  Clothing: Clothing: Other (comment)(street clothing placed in belongings bag)  Other Valuables:  Other Valuables: Eyeglasses  Valuables sent to safe:

## 2021-03-17 NOTE — ANESTHESIA PREPROCEDURE EVALUATION
Relevant Problems   No relevant active problems       Anesthetic History   No history of anesthetic complications            Review of Systems / Medical History  Patient summary reviewed and pertinent labs reviewed    Pulmonary  Within defined limits                 Neuro/Psych   Within defined limits           Cardiovascular                  Exercise tolerance: >4 METS     GI/Hepatic/Renal  Within defined limits              Endo/Other  Within defined limits           Other Findings              Physical Exam    Airway  Mallampati: I  TM Distance: 4 - 6 cm  Neck ROM: normal range of motion   Mouth opening: Normal     Cardiovascular    Rhythm: regular  Rate: normal         Dental  No notable dental hx       Pulmonary  Breath sounds clear to auscultation               Abdominal  GI exam deferred       Other Findings            Anesthetic Plan    ASA: 1  Anesthesia type: general          Induction: Intravenous  Anesthetic plan and risks discussed with: Patient

## 2021-03-17 NOTE — ANESTHESIA POSTPROCEDURE EVALUATION
Procedure(s):  LOOP ELECTRODE EXCISION PROCEDURE OF CERVIX LEEP. general    Anesthesia Post Evaluation      Multimodal analgesia: multimodal analgesia not used between 6 hours prior to anesthesia start to PACU discharge  Patient location during evaluation: PACU  Patient participation: complete - patient participated  Level of consciousness: awake and alert  Pain score: 1  Pain management: satisfactory to patient  Airway patency: patent  Anesthetic complications: no  Cardiovascular status: acceptable  Respiratory status: acceptable  Hydration status: acceptable  Post anesthesia nausea and vomiting:  none  Final Post Anesthesia Temperature Assessment:  Normothermia (36.0-37.5 degrees C)      INITIAL Post-op Vital signs:   Vitals Value Taken Time   /63 03/17/21 1000   Temp     Pulse 51 03/17/21 1002   Resp 16 03/17/21 1002   SpO2 100 % 03/17/21 1002   Vitals shown include unvalidated device data.

## 2021-03-19 NOTE — PROGRESS NOTES
Pathology:   1.  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 Ectocervical margin is free of involvement. 2.  Endocervical LEEP:        Benign endocervical tissue.    (include in [address] notes)  Notify pt if [employer] message not read or not active.   Update PSH: Include date of procedure and procedure name (check op note prn),   MD initials performing surgery (put MD initials in comments)  rec pap/hpv six months, tickle  rec POP fu 4 wks

## 2021-03-23 NOTE — PROGRESS NOTES
Viewed by Ritika Hayden on 3/21/2021  7:45 PM    Updated PMH and PSH    Sent [employer] message inquiring about scheduling 4 week post op

## 2021-04-19 ENCOUNTER — TELEPHONE (OUTPATIENT)
Dept: OBGYN CLINIC | Age: 36
End: 2021-04-19

## 2021-04-19 ENCOUNTER — OFFICE VISIT (OUTPATIENT)
Dept: OBGYN CLINIC | Age: 36
End: 2021-04-19
Payer: COMMERCIAL

## 2021-04-19 VITALS
WEIGHT: 140.6 LBS | BODY MASS INDEX: 25.72 KG/M2 | SYSTOLIC BLOOD PRESSURE: 127 MMHG | DIASTOLIC BLOOD PRESSURE: 66 MMHG | HEART RATE: 80 BPM

## 2021-04-19 DIAGNOSIS — N93.9 ABNORMAL UTERINE BLEEDING (AUB): ICD-10-CM

## 2021-04-19 DIAGNOSIS — D21.9 MYOMA: ICD-10-CM

## 2021-04-19 DIAGNOSIS — D06.9 HIGH GRADE SQUAMOUS INTRAEPITHELIAL LESION (HGSIL), GRADE 3 CIN, ON BIOPSY OF CERVIX: Primary | ICD-10-CM

## 2021-04-19 PROCEDURE — 99024 POSTOP FOLLOW-UP VISIT: CPT | Performed by: OBSTETRICS & GYNECOLOGY

## 2021-04-19 NOTE — TELEPHONE ENCOUNTER
Call received at 3:07PM      28year old patient last seen in the office on Procedure(s):  LOOP ELECTRODE EXCISION PROCEDURE OF CERVIX LEEP 3/17/2021    Patient calling to say that she started her period and is wondering if she can still be seen    Patient states the bleeding is not just spotting    Patient advised she can be seen but was advised of the 15 minute last policy and verbalized understanding.

## 2021-04-19 NOTE — PATIENT INSTRUCTIONS
Loop Electrosurgical Excision Procedure (LEEP): What to Expect at [facility] Your Recovery LEEP removes tissue from the cervix. Your procedure removed abnormal tissue from your cervix. You may have mild cramping for several hours after the procedure. A dark brown vaginal discharge during the first week is normal. You can use a sanitary pad for the bleeding. You may also have some spotting for about 3 weeks. How long it takes to recover will depend on how much was done during the procedure. This care sheet gives you a general idea about how long it will take for you to recover. But each person recovers at a different pace. Follow the steps below to feel better as quickly as possible. How can you care for yourself at home? Activity 
  · You should be able to go back to your normal activities in 1 to 3 days. Medicines 
  · Your doctor will tell you if and when you can restart your medicines. He or she will also give you instructions about taking any new medicines.  
  · If you take aspirin or some other blood thinner, ask your doctor if and when to start taking it again. Make sure that you understand exactly what your doctor wants you to do.  
  · Take an over-the-counter pain medicine, such as acetaminophen (Tylenol), ibuprofen (Advil, Motrin), or naproxen (Aleve). Read and follow all instructions on the label.  
  · Do not take two or more pain medicines at the same time unless the doctor told you to. Many pain medicines have acetaminophen, which is Tylenol. Too much acetaminophen (Tylenol) can be harmful. Exercise 
  · Do not exercise for 1 to 3 days after the procedure. Other instructions 
  · Use a sanitary pad if you have bleeding.  
  · Do not have sexual intercourse or use tampons for 3 weeks. Do not douche. This will allow your cervix to heal.  
  · You can take a shower anytime after the procedure. Ask your doctor when it is okay to take a bath.  
  · Be sure to have regular follow-up Pap tests. Your doctor can tell you how often you need to have Pap tests. Follow-up care is a key part of your treatment and safety. Be sure to make and go to all appointments, and call your doctor if you are having problems. It's also a good idea to know your test results and keep a list of the medicines you take. When should you call for help? Call 911 anytime you think you may need emergency care. For example, call if: 
  · You passed out (lost consciousness).  
  · You have chest pain, are short of breath, or cough up blood. Call your doctor now or seek immediate medical care if: 
  · You have pain that does not get better after you take pain medicine.  
  · You cannot pass stools or gas.  
  · You have signs of infection, such as: 
? Increased pain, swelling, warmth, or redness. ? A fever.  
  · You have bright red vaginal bleeding that soaks one or more pads in an hour, or you have large clots.  
  · You have vaginal discharge that has increased in amount or smells bad.  
  · You are sick to your stomach or cannot drink fluids.  
  · You have signs of a blood clot in your leg (called a deep vein thrombosis), such as: 
? Pain in your calf, back of the knee, thigh, or groin. ? Redness or swelling in your leg. Watch closely for any changes in your health, and be sure to contact your doctor if you have any problems. Where can you learn more? Go to http://www.gray.com/ Enter [phone] in the search box to learn more about \"Loop Electrosurgical Excision Procedure (LEEP): What to Expect at Home. \" Current as of: December 17, 2020               Content Version: 12.8 © 6982-3006 Healthwise, Incorporated. Care instructions adapted under license by [employer] (which disclaims liability or warranty for this information).  If you have questions about a medical condition or this instruction, always ask your healthcare professional. [employer] disclaims any warranty or liability for your use of this information. Abnormal Uterine Bleeding: Care Instructions Your Care Instructions Abnormal uterine bleeding is irregular bleeding from the uterus that is longer or heavier than usual or does not occur at your regular time. Sometimes it is caused by changes in hormone levels. It can also be caused by growths in the uterus, such as fibroids or polyps. Sometimes a cause cannot be found. You may have heavy bleeding when you are not expecting your period. Your doctor may suggest a pregnancy test, if you think you are pregnant. Follow-up care is a key part of your treatment and safety. Be sure to make and go to all appointments, and call your doctor if you are having problems. It's also a good idea to know your test results and keep a list of the medicines you take. How can you care for yourself at home? · Be safe with medicines. Take pain medicines exactly as directed. ? If the doctor gave you a prescription medicine for pain, take it as prescribed. ? If you are not taking a prescription pain medicine, ask your doctor if you can take an over-the-counter medicine. · You may be low in iron because of blood loss. Eat a balanced diet that is high in iron and vitamin C. Foods rich in iron include red meat, shellfish, eggs, beans, and leafy green vegetables. Talk to your doctor about whether you need to take iron pills or a multivitamin. When should you call for help? Call 911 anytime you think you may need emergency care. For example, call if: 
  · You passed out (lost consciousness). Call your doctor now or seek immediate medical care if: 
  · You have new or worse belly or pelvic pain.  
  · You have severe vaginal bleeding.  
  · You feel dizzy or lightheaded, or you feel like you may faint.   
Watch closely for changes in your health, and be sure to contact your doctor if: 
  · You think you may be pregnant.  
  · Your bleeding gets worse.  
  · You do not get better as expected. Where can you learn more? Go to http://www.gray.com/ Enter N305 in the search box to learn more about \"Abnormal Uterine Bleeding: Care Instructions. \" Current as of: July 17, 2020               Content Version: 12.8 © 3349-8360 Healthwise, [employer]. Care instructions adapted under license by [employer] (which disclaims liability or warranty for this information). If you have questions about a medical condition or this instruction, always ask your healthcare professional. [address] any warranty or liability for your use of this information.

## 2021-04-19 NOTE — PROGRESS NOTES
[facility]  [email or web address]  [phone]    Gustavo Williamson MD, FACOG         Subjective:     Chief Complaint   Patient presents with    Post OP Follow Up   BIANCA Alvarado is a 28 y.o. who presents today for postop check. She has a cx lesion. She had a leep procedure that was done 4 weeks ago. She has a ho AUB. Ho PCP with male partners, currently with female. Current symptoms: Patient has had on and off heavy bleeding since 2021 which is when she would've had her next period. It has not stopped since then. She also reports feeling more fatigued. She states since the procedure, she has not returned to most daily activities, ambulating, and not lifting or exercising. She had the following pathology results:    1. Inferior and superior ectocervical LEEP conization:   Severe dysplasia (ANTHONY III) present in sections from 9:00 to 12:00 and extending to endocervical margin. Ectocervical margin is free of involvement. 2. Endocervical LEEP:   Benign endocervical tissue. Since the patient's surgery, she has had typical postoperative discomfort but no significant symptoms or problems since the surgery. The patient's incision is healing well with no significant drainage. She states since the procedure, she has returned to full daily activities, ambulating, and not lifting or exercising.       Past Medical History:   Diagnosis Date    Abnormal Pap smear 2019    HGSIL     1/15    Chlamydia     not since [employer] 10th grade    ANTHONY I (cervical intraepithelial neoplasia I) 2019    ANTHONY III (cervical intraepithelial neoplasia grade III) with severe dysplasia 2021    Encounter for insertion of mirena IUD 2/23/15    removal in 5 years   Angelina Stephen Gonorrhea     last episode in [facility] 10 th grade age 12 tx received    Pap smear for cervical cancer screening 16    Neg,HPV Neg    Pap smear for cervical cancer screening 12/08/2020    ASCUS, HPV positive    Psychiatric problem     depression in highschool, currently seeing someone at [facility] 2/26/20     Past Surgical History:   Procedure Laterality Date    HX COLPOSCOPY  09/26/2019    ANTHONY I, benign    HX COLPOSCOPY  01/15/2021    ECC:benign, ectocx:CIN2    HX HERNIA REPAIR  10/02/2020    Maryelizabeth Goldmann, MD    HX LEEP PROCEDURE  03/17/2021    ANTHONY III    HX LIPECTOMY  12/31/2020    Dr. Maryelizabeth Goldmann     Current Outpatient Medications   Medication Sig    ibuprofen (MOTRIN) 600 mg tablet Take 1 Tab by mouth every six (6) hours as needed for Pain. Take with food.  ascorbic acid, vitamin C, (ascorbic acid) 100 mg tab Take  by mouth. Unsure of dose    PNV RI.39/ZHOPZOD fum/folic ac (PRENATAL PO) Take  by mouth.  multivitamin (ONE A DAY) tablet Take 1 Tab by mouth daily. No current facility-administered medications for this visit. Allergies   Allergen Reactions    Latex, Natural Rubber Swelling    Clindamycin Hives     Family History   Problem Relation Age of Onset    Headache Mother    [facility] Migraines Mother     Hypertension Mother     Arthritis-osteo Maternal Aunt     Alcohol abuse Maternal Grandfather     Cancer Paternal Grandmother     Breast Cancer Paternal Grandmother         31-43    Asthma Paternal Grandfather      Social History     Socioeconomic History    Marital status: SINGLE     Spouse name: Not on file    Number of children: Not on file    Years of education: Not on file    Highest education level: Not on file   Occupational History    Not on file   Social Needs    Financial resource strain: Not on file    Food insecurity     Worry: Not on file     Inability: Not on file    Transportation needs     Medical: Not on file     Non-medical: Not on file   Tobacco Use    Smoking status: Former Smoker    Smokeless tobacco: Never Used   Substance and Sexual Activity    Alcohol use:  Yes     Alcohol/week: 1.0 standard drinks     Types: 1 Standard drinks or equivalent per week    Drug use: No    Sexual activity: Yes     Partners: Female     Birth control/protection: None     Comment: female only partners   Lifestyle    Physical activity     Days per week: Not on file     Minutes per session: Not on file    Stress: Not on file   Relationships    Social connections     Talks on phone: Not on file     Gets together: Not on file     Attends [employer] service: Not on file     Active member of club or organization: Not on file     Attends meetings of clubs or organizations: Not on file     Relationship status: Not on file    Intimate partner violence     Fear of current or ex partner: Not on file     Emotionally abused: Not on file     Physically abused: Not on file     Forced sexual activity: Not on file   Other Topics Concern    Not on file   Social History Narrative    Not on file     OB History        3    Para   1    Term           1    AB   2    Living   1       SAB   1    TAB   1    Ectopic   0    Molar        Multiple   0    Live Births              Obstetric Comments   Menarche:  8. LMP: 3/26/18. # of Children:  2. Age at Delivery of First Child:  21.   Hysterectomy/oophorectomy:  NO/NO. Breast Bx:  no.  Hx of Breast Feeding:  yes. BCP:  yes.  Hormone therapy:  no.                    Review of Systems - History obtained from the patient  Constitutional: negative for weight loss, fever, night sweats  HEENT: negative for hearing loss, earache, congestion, snoring, sorethroat  CV: negative for chest pain, palpitations, edema  Resp: negative for cough, shortness of breath, wheezing  GI: negative for change in bowel habits, abdominal pain, black or bloody stools  : see HPI  MSK: negative for back pain, joint pain, muscle pain  Breast: negative for breast lumps, nipple discharge, galactorrhea  Skin :negative for itching, rash, hives  Neuro: negative for dizziness, headache, confusion, weakness  Psych: negative for anxiety, depression, change in mood  Heme/lymph: negative for bleeding, bruising, pallor      Objective:     Visit Vitals  /66   Pulse 80   Wt 140 lb 9.6 oz (63.8 kg)   BMI 25.72 kg/m²             Chief Complaint:   Chief Complaint   Patient presents with    Post OP Follow Up         [address]:  Past Medical History:   Diagnosis Date    Abnormal Pap smear 2019    HGSIL     1/15    Chlamydia     not since [facility] 10th grade    ANTHONY I (cervical intraepithelial neoplasia I) 2019    ANTHONY III (cervical intraepithelial neoplasia grade III) with severe dysplasia 2021    Encounter for insertion of mirena IUD 2/23/15    removal in 5 years   [facility] Gonorrhea     last episode in [facility] 10 th grade age 12 tx received    Pap smear for cervical cancer screening 16    Neg,HPV Neg    Pap smear for cervical cancer screening 2020    ASCUS, HPV positive    Psychiatric problem     depression in [facility], currently seeing someone at [facility]     Past Surgical History:   Procedure Laterality Date    HX COLPOSCOPY  2019    ANTHONY I, benign    HX COLPOSCOPY  01/15/2021    [address], ectocx:CIN2    HX HERNIA REPAIR  10/02/2020    Radha Malave MD    HX LEEP PROCEDURE  2021    ANTHONY III    HX LIPECTOMY  2020    Dr. Radha Malave     Family History   Problem Relation Age of Onset    Headache Mother     Migraines Mother     Hypertension Mother     Arthritis-osteo Maternal Aunt     Alcohol abuse Maternal Grandfather     Cancer Paternal Grandmother     Breast Cancer Paternal Grandmother         31-43    Asthma Paternal Grandfather      Social History     Tobacco Use    Smoking status: Former Smoker    Smokeless tobacco: Never Used   Substance Use Topics    Alcohol use:  Yes     Alcohol/week: 1.0 standard drinks     Types: 1 Standard drinks or equivalent per week    Drug use: No     Allergies   Allergen Reactions    Latex, Natural Rubber Swelling    Clindamycin Hives Current Outpatient Medications   Medication Sig    ibuprofen (MOTRIN) 600 mg tablet Take 1 Tab by mouth every six (6) hours as needed for Pain. Take with food.  ascorbic acid, vitamin C, (ascorbic acid) 100 mg tab Take  by mouth. Unsure of dose    PNV JZ.10/VBLAQNO fum/folic ac (PRENATAL PO) Take  by mouth.  multivitamin (ONE A DAY) tablet Take 1 Tab by mouth daily. No current facility-administered medications for this visit. Review of Systems:  History obtained from the patient  Constitutional: negative for fevers, chills and weight loss  ENT ROS: negative for - hearing change, oral lesions or visual changes  Respiratory: negative for cough, wheezing or dyspnea on exertion  Cardiovascular: negative for chest pain, irregular heart beats, exertional chest pressure/discomfort  Gastrointestinal: negative for dysphagia, nausea and vomiting  Genito-Urinary ROS:  see HPI  Inteument/breast: negative for rash, breast lump and nipple discharge  Musculoskeletal:negative for stiff joints, neck pain and muscle weakness  Endocrine ROS: negative for - breast changes, galactorrhea or temperature intolerance  Hematological and Lymphatic ROS: negative for - blood clots, bruising or swollen lymph nodes    Physical Exam:  Visit Vitals  /66   Pulse 80   Wt 140 lb 9.6 oz (63.8 kg)   BMI 25.72 kg/m²       GENERAL: alert, well appearing, and in no distress  HEAD: normocephalic, atraumatic.    PULM: clear to auscultation, no wheezes, rales or rhonchi, symmetric air entry   COR: normal rate and regular rhythm, S1 and S2 normal   ABDOMEN: soft, nontender, nondistended, no masses or organomegaly   EGBUS: no lesions, no inflammation, no masses  VULVA: normal appearing vulva with no masses, tenderness or lesions  VAGINA: normal appearing vagina with normal color, no lesions, blood tinged discharge  CERVIX: normal appearing cervix without discharge or lesions, non tender, prolapsing myoma 2 cm, friable  UTERUS: uterus is normal size, shape, consistency and nontender   ADNEXA: normal adnexa in size, nontender and no masses  NEURO: alert, oriented, normal speech        Assessment:  Encounter Diagnoses   Name Primary?  High grade squamous intraepithelial lesion (HGSIL), grade 3 ROXANA, on biopsy of cervix Yes    Myoma     Abnormal uterine bleeding (AUB)        Plan:  The patient is advised that she should contact the office if she does not note improvement or if symptoms recur  Recommend follow up with PCP for non-gynecologic complaints and chronic medical problems. She should contact our office with any questions or concerns  She could keep her routine annual exam appointment. Bleeding precautions  Disc leep fu 6mos  Fu and US: disc possible hysteroscopy vs definitive surgical management  Bleeding precautions  Disc postop leep changes vs new finding of myoma/prolpased  Reviewed prior us at our office, no myomas seen  + CT: density in uterus, ? Myoma  I spent 30 minutes of face to face time counseling and discussing myoma, leep, roxana 3, aub, PCB with the patient. More than 50 % of her visit was spent performing counseling. Bleeding precautions       XR Results (maximum last 3): No results found for this or any previous visit. CT Results (maximum last 3): Results from Abstract encounter on 03/23/21   CT ABD PELV W CONT       MRI Results (maximum last 3): No results found for this or any previous visit. Nuclear Medicine Results (maximum last 3): No results found for this or any previous visit. US Results (maximum last 3): Results from Appointment encounter on 05/27/20   US TRANSVAGINAL    Narrative See impression. Impression Impression: The final result for this exam can be located in this patient's chart with  results from [facility].    Results from [place] encounter on 04/18/18   US BREAST RT LIMITED=<3 QUAD    Narrative STUDY:  Bilateral Diagnostic Digital Mammography and right breast Ultrasound    INDICATION:  Right lateral breast firmness and pain from 9-12 o'clock. No focal  lump. VIEWS OBTAINED: Bilateral CC, MLO, right mL, right CC and MLO spots    COMPARISON:  None. Baseline. BREAST COMPOSITION: The breast tissue is heterogeneously dense, which could  obscure detection of small masses (approximately 51-75% glandular). FINDINGS:    Mammography:  Bilateral digital diagnostic mammography was performed, and is  interpreted in conjunction with a computer assisted detection (CAD) system. There is no mammographic abnormality to correspond to the area of firmness and  pain throughout the upper outer quadrant of the right breast. No suspicious  masses or microcalcifications are seen in either breast.     Ultrasound: Ultrasound was performed in the area of clinical concern  demonstrating normal dense fibroglandular tissue. No suspicious solid or cystic  mass. Impression IMPRESSION:    1. BI-RADS Assessment Category 1: Negative. Recommendations:  Recommend routine screening annual screening mammography starting at age 36  unless clinically indicated earlier. The patient has been notified of these results and recommendations. Results from [place] encounter on 05/11/15   US TRANSVAGINAL    Narrative **Final Report**       ICD Codes / Adm. Diagnosis: V25.42   / Surveillance of previously pre    ExaminationArvis Parishlich OB  - [id number] - May 11 2015  3:36PM  Accession No:  [id number]  Reason:  iud check      REPORT:  See impression. IMPRESSION:      The final result for this exam can be located in this patient's chart with   results from [facility]. Signing/Reading Doctor: BILL  ([id number])    Sylvia Morton  ([id number])  May 11 2015  3:37PM                                  DEXA Results (maximum last 3): No results found for this or any previous visit. NGOC Results (maximum last 3):   Results from [facility] Encounter encounter on 04/18/18   [facility] MAMMO BI DX INCL CAD    Narrative STUDY:  Bilateral Diagnostic Digital Mammography and right breast Ultrasound    INDICATION:  Right lateral breast firmness and pain from 9-12 o'clock. No focal  lump. VIEWS OBTAINED: Bilateral CC, MLO, right mL, right CC and MLO spots    COMPARISON:  None. Baseline. BREAST COMPOSITION: The breast tissue is heterogeneously dense, which could  obscure detection of small masses (approximately 51-75% glandular). FINDINGS:    Mammography:  Bilateral digital diagnostic mammography was performed, and is  interpreted in conjunction with a computer assisted detection (CAD) system. There is no mammographic abnormality to correspond to the area of firmness and  pain throughout the upper outer quadrant of the right breast. No suspicious  masses or microcalcifications are seen in either breast.     Ultrasound: Ultrasound was performed in the area of clinical concern  demonstrating normal dense fibroglandular tissue. No suspicious solid or cystic  mass. Impression IMPRESSION:    1. BI-RADS Assessment Category 1: Negative. Recommendations:  Recommend routine screening annual screening mammography starting at age 36  unless clinically indicated earlier. The patient has been notified of these results and recommendations. IR Results (maximum last 3): No results found for this or any previous visit. VAS/US Results (maximum last 3): No results found for this or any previous visit. PET Results (maximum last 3): No results found for this or any previous visit. No orders of the defined types were placed in this encounter. No results found for this visit on 04/19/21.

## 2021-05-10 ENCOUNTER — OFFICE VISIT (OUTPATIENT)
Dept: OBGYN CLINIC | Age: 36
End: 2021-05-10

## 2021-05-10 VITALS
DIASTOLIC BLOOD PRESSURE: 81 MMHG | SYSTOLIC BLOOD PRESSURE: 148 MMHG | BODY MASS INDEX: 26.17 KG/M2 | WEIGHT: 142.2 LBS | HEART RATE: 77 BPM | HEIGHT: 62 IN

## 2021-05-10 DIAGNOSIS — R93.89 ABNORMAL GENITOURINARY ULTRASOUND: ICD-10-CM

## 2021-05-10 DIAGNOSIS — N88.8 CERVICAL MASS: ICD-10-CM

## 2021-05-10 DIAGNOSIS — N93.9 ABNORMAL UTERINE BLEEDING: Primary | ICD-10-CM

## 2021-05-10 DIAGNOSIS — N89.8 VAGINAL DISCHARGE: ICD-10-CM

## 2021-05-10 PROCEDURE — 99213 OFFICE O/P EST LOW 20 MIN: CPT | Performed by: OBSTETRICS & GYNECOLOGY

## 2021-05-10 RX ORDER — HYDROCORTISONE 25 MG/G
CREAM TOPICAL
COMMUNITY
Start: 2021-03-01 | End: 2021-11-10

## 2021-05-10 NOTE — PATIENT INSTRUCTIONS
Abnormal Uterine Bleeding: Care Instructions Your Care Instructions Abnormal uterine bleeding is irregular bleeding from the uterus that is longer or heavier than usual or does not occur at your regular time. Sometimes it is caused by changes in hormone levels. It can also be caused by growths in the uterus, such as fibroids or polyps. Sometimes a cause cannot be found. You may have heavy bleeding when you are not expecting your period. Your doctor may suggest a pregnancy test, if you think you are pregnant. Follow-up care is a key part of your treatment and safety. Be sure to make and go to all appointments, and call your doctor if you are having problems. It's also a good idea to know your test results and keep a list of the medicines you take. How can you care for yourself at home? · Be safe with medicines. Take pain medicines exactly as directed. ? If the doctor gave you a prescription medicine for pain, take it as prescribed. ? If you are not taking a prescription pain medicine, ask your doctor if you can take an over-the-counter medicine. · You may be low in iron because of blood loss. Eat a balanced diet that is high in iron and vitamin C. Foods rich in iron include red meat, shellfish, eggs, beans, and leafy green vegetables. Talk to your doctor about whether you need to take iron pills or a multivitamin. When should you call for help? Call 911 anytime you think you may need emergency care. For example, call if: 
  · You passed out (lost consciousness). Call your doctor now or seek immediate medical care if: 
  · You have new or worse belly or pelvic pain.  
  · You have severe vaginal bleeding.  
  · You feel dizzy or lightheaded, or you feel like you may faint. Watch closely for changes in your health, and be sure to contact your doctor if: 
  · You think you may be pregnant.  
  · Your bleeding gets worse.  
  · You do not get better as expected. Where can you learn more?  
Go to http://www.gray.com/ Enter K622 in the search box to learn more about \"Abnormal Uterine Bleeding: Care Instructions. \" Current as of: July 17, 2020               Content Version: 12.8 © 2006-2021 Healthwise, [facility]. Care instructions adapted under license by [employer] (which disclaims liability or warranty for this information). If you have questions about a medical condition or this instruction, always ask your healthcare professional. [place] [address] any warranty or liability for your use of this information.

## 2021-05-10 NOTE — PROGRESS NOTES
[address] [facility]  [email or web address]    Ortiz MD Neris, [address] [facility]       OB/GYN Follow-up visit    Chief Complaint: Follow up visit  Chief Complaint   Patient presents with    Follow-up    Vaginal Bleeding    Ultrasound       History of Present Illness: This is a follow up visit from 2021. She is having a follow up for AUB. The patient reports having bleeding since her Leep procedure on 3/17/2021. She is now spotting. She has a pain in her LLQ for the past year. She reports the symptoms are has slightly improved. Aggravating factors include ovulation times make her pain worse. Alleviating factors include none. She does not have other concerns. LMP: No LMP recorded (lmp unknown). Ultrasound:  TRANSVAGINAL ULTRASOUND PERFORMED  UTERUS IS ANTEVERTED, NORMAL IN SIZE AND ECHOGENICITY. THE CERVIX APPEARS TO HAVE A SOLID MASS WITH BLOODFLOW THAT MEASURES 3.5 X 1.7 X 1.9CM. ENDOMETRIUM MEASURES 9-10MM IN THICKNESS. NO EVIDENCE OF MASSES OR ABNORMALITIES ARE SEEN. RIGHT OVARY APPEARS WITHIN NORMAL LIMITS. LEFT OVARY APPEARS TO HAVE A COMPLEX MASS WITH BLOODFLOW IN THE PERIPHERY THAT MEASURES 2.1 X  1.3 X 1.2CM. THIS MAY BE HEMORRHAGIC. NO FREE FLUID SEEN IN THE CDS.     PFSH:  Past Medical History:   Diagnosis Date    Abnormal Pap smear 2019    HGSIL     1/15    Chlamydia     not since [facility] 10th grade    ANTHONY I (cervical intraepithelial neoplasia I) 2019    ANTHONY III (cervical intraepithelial neoplasia grade III) with severe dysplasia 2021    Encounter for insertion of mirena IUD 2/23/15    removal in 5 years   Georgianna Olszewski Gonorrhea     last episode in [facility] 10 th grade age 12 tx received    Pap smear for cervical cancer screening 16    Neg,HPV Neg    Pap smear for cervical cancer screening 2020    ASCUS, HPV positive    Psychiatric problem     depression in [facility], currently seeing someone at [facility] one 20     Past Surgical History:   Procedure Laterality Date    HX COLPOSCOPY  09/26/2019    ANTHONY I, benign    HX COLPOSCOPY  01/15/2021    ECC:benign, ectocx:CIN2    HX HERNIA REPAIR  10/02/2020    James Cleveland MD    HX LEEP PROCEDURE  03/17/2021    ANTHONY III    HX LIPECTOMY  12/31/2020    Dr. James Cleveland     Family History   Problem Relation Age of Onset    Headache Mother     Migraines Mother     Hypertension Mother     Arthritis-osteo Maternal Aunt     Alcohol abuse Maternal Grandfather     Cancer Paternal Grandmother     Breast Cancer Paternal Grandmother         31-43    Asthma Paternal Grandfather      Social History     Tobacco Use    Smoking status: Former Smoker    Smokeless tobacco: Never Used   Substance Use Topics    Alcohol use: Yes     Alcohol/week: 1.0 standard drinks     Types: 1 Standard drinks or equivalent per week    Drug use: No     Allergies   Allergen Reactions    Latex, Natural Rubber Swelling    Clindamycin Hives     Current Outpatient Medications   Medication Sig    hydrocortisone (HYTONE) 2.5 % topical cream APPLY TO AFFECTED AREA OF FACE TWICE A DAY AS NEEDED    multivitamin (ONE A DAY) tablet Take 1 Tab by mouth daily. No current facility-administered medications for this visit.         Review of Systems:  History obtained from the patient  Constitutional: negative for fevers, chills and weight loss  ENT ROS: negative for - hearing change, oral lesions or visual changes  Respiratory: negative for cough, wheezing or dyspnea on exertion  Cardiovascular: negative for chest pain, irregular heart beats, exertional chest pressure/discomfort  Gastrointestinal: negative for dysphagia, nausea and vomiting  Genito-Urinary ROS: no dysuria, trouble voiding, or hematuria  Inteument/breast: negative for rash, breast lump and nipple discharge  Musculoskeletal:negative for stiff joints, neck pain and muscle weakness  Endocrine ROS: negative for - breast changes, galactorrhea or temperature intolerance  Hematological and Lymphatic ROS: negative for - blood clots, bruising or swollen lymph nodes    Physical Exam:  Visit Vitals  BP (!) 148/81 (BP 1 Location: Right upper arm, BP Patient Position: Sitting, BP Cuff Size: Adult)   Pulse 77   Ht 5' 2\" (1.575 m)   Wt 142 lb 3.2 oz (64.5 kg)   BMI 26.01 kg/m²       GENERAL: alert, well appearing, and in no distress  PULM: clear to auscultation, no wheezes, rales or rhonchi, symmetric air entry   COR: normal rate and regular rhythm, S1 and S2 normal   ABDOMEN: soft, nontender, nondistended, no masses or organomegaly   EGBUS: no lesions, no inflammation, no masses  VULVA: normal appearing vulva with no masses, tenderness or lesions  VAGINA: normal appearing vagina with normal color, no lesions, no discharge  CERVIX: normal appearing cervix without discharge or lesions, non tender  Firm mass at os ~1 cm visiible  UTERUS: uterus is normal size, shape, consistency and nontender   ADNEXA: normal adnexa in size, nontender and no masses  NEURO: alert, oriented, normal speech    Assessment:  Encounter Diagnoses   Name Primary?  Vaginal discharge     Abnormal uterine bleeding Yes    Abnormal genitourinary ultrasound     Cervical mass        Plan:  The patient is advised that she should contact the office with any questions or concerns. She should make her routine annual gynecologic appointment if needed. Disc option of myoma/mass removal in office vs OR, because of size and concern about bleeding control and complete removal will defer to OR. I discussed the risks of the procedure including bleeding, infection, wound healing problems, blood clots, injury to internal organs including her bladder or bowel, reaction to the surgical prep or reaction to local and general anesthetic. I also discussed alternatives to surgery including not having surgery.   She understands the risks, benefits and alternatives to the procedure; any and all questions were answered to her satisfaction. She want to proceed with a: hystero D and C myomectomy  Rec BP log       We discussed potential causes of vaginal discharge/irritation. We discussed good vulvar hygiene. Recommended avoid vaginal irritants. Discussed use of mild soaps/detergents. Follow up if NI. Patient will be notified about swab results and prescription sent, if indicated. On this date, 5/10/2021,  I have spent 25 minutes reviewing previous notes, US, path, test results and face to face with the patient discussing the diagnosis and importance of compliance with the treatment plan as well as documenting on the day of the visit. Disc option of hormonal management pt defers to see if sx improve after removal of myoma/mass        No orders of the defined types were placed in this encounter. No results found for this visit on 05/10/21. Nino Smith MD    Physician review of ultrasound performed by technician    Today's ultrasound report and images were reviewed and discussed with the patient.   Please see images and imaging report entered by technician in PACS for more detail and progress note and diagnosis entered by MD.    Luis Daniel Fox MD

## 2021-05-12 LAB
A VAGINAE DNA VAG QL NAA+PROBE: ABNORMAL SCORE
BVAB2 DNA VAG QL NAA+PROBE: ABNORMAL SCORE
C ALBICANS DNA VAG QL NAA+PROBE: NEGATIVE
C GLABRATA DNA VAG QL NAA+PROBE: NEGATIVE
C TRACH DNA VAG QL NAA+PROBE: NEGATIVE
MEGA1 DNA VAG QL NAA+PROBE: ABNORMAL SCORE
N GONORRHOEA DNA VAG QL NAA+PROBE: NEGATIVE
SPECIMEN STATUS REPORT, ROLRST: NORMAL
T VAGINALIS DNA VAG QL NAA+PROBE: NEGATIVE

## 2021-05-14 RX ORDER — METRONIDAZOLE 500 MG/1
500 TABLET ORAL 2 TIMES DAILY
Qty: 14 TAB | Refills: 0 | Status: SHIPPED | OUTPATIENT
Start: 2021-05-14 | End: 2021-05-21

## 2021-05-14 NOTE — PROGRESS NOTES
Abnormal,Consistent with BV   Notify pt if [employer] message not read or not active.   Rx:   flagyl 500mg bid   x 7 days    May cause nausea, take with food  Avoid etoh during treatment and 1 day following  Notify MD if NI after 1 week    (If patient prefers vaginal tx't  0.75 %t metronidazole vaginal gel   5 grams qhs per vagina  x5 days)

## 2021-05-18 DIAGNOSIS — N93.9 ABNORMAL UTERINE BLEEDING: ICD-10-CM

## 2021-05-18 DIAGNOSIS — D21.9 MYOMA: Primary | ICD-10-CM

## 2021-06-12 ENCOUNTER — HOSPITAL ENCOUNTER (OUTPATIENT)
Dept: PREADMISSION TESTING | Age: 36
Discharge: HOME | End: 2021-06-12
Payer: COMMERCIAL

## 2021-06-12 PROCEDURE — U0005 INFEC AGEN DETEC AMPLI PROBE: HCPCS

## 2021-06-14 LAB
SARS-COV-2, XPLCVT: NOT DETECTED
SOURCE, COVRS: NORMAL

## 2021-06-15 ENCOUNTER — ANESTHESIA EVENT (OUTPATIENT)
Dept: SURGERY | Age: 36
End: 2021-06-15
Payer: COMMERCIAL

## 2021-06-15 PROCEDURE — 58145 MYOMECTOMY VAG METHOD: CPT | Performed by: OBSTETRICS & GYNECOLOGY

## 2021-06-15 NOTE — PROGRESS NOTES
Pt states she has received many phone calls while at work and wishes that we could consolidate our calls

## 2021-06-15 NOTE — PERIOP NOTES
[address]                  
[address] [facility], [address] [facility] MAIN OR                                  [phone] MAIN PRE OP                          [phone]                                                                                AMBULATORY PRE OP          [phone] PRE-ADMISSION TESTING    21  Surgery Date: Wednesday 6/16/21 Is surgery arrival time given by surgeon? YES  NO If [facility] staff will call you between 3 and 7pm the day before your surgery with your arrival time. (If your surgery is on a Monday, we will call you the Friday before.) Call [phone] after 7pm Monday-Friday if you did not receive this call. INSTRUCTIONS BEFORE YOUR SURGERY When You 
Arrive Arrive at the 2nd [address] [facility] on the day of your surgery Have your insurance card, photo ID, and any copayment (if needed) Food 
 and  
Drink NO food or drink after midnight the night before surgery This means NO water, gum, mints, coffee, juice, etc. 
No alcohol (beer, wine, liquor) 24 hours before and after surgery Medications to TAKE Morning of Surgery MEDICATIONS TO TAKE THE MORNING OF SURGERY WITH A SIP OF WATER:  
 Medications To 
STOP      7 days before surgery  Non-Steroidal anti-inflammatory Drugs (NSAID's): for example, Ibuprofen (Advil, Motrin), Naproxen (Aleve)  Aspirin, if taking for pain  Herbal supplements, vitamins, and fish oil  Other: 
(Pain medications not listed above, including Tylenol may be taken) Blood Thinners  If you take  Aspirin, Plavix, Coumadin, or any blood-thinning or anti-blood clot medicine, talk to the doctor who prescribed the medications for pre-operative instructions. Bathing Clothing Jewelry Valuables  If you shower the morning of surgery, please do not apply anything to your skin (lotions, powders, deodorant, or makeup, especially mascara)  Follow Chlorhex[employer] body wash instructions provided to you during PAT appointment. Begin 3 days prior to surgery.  Do not shave or trim anywhere 24 hours before surgery  Wear your hair loose or down; no pony-tails, buns, or metal hair clips  Wear loose, comfortable, clean clothes  Wear glasses instead of contacts  Leave money, valuables, and jewelry, including body piercings, at home  If you were given an [employer], bring it on day of surgery. Going Home - or Spending the Night  SAME-DAY SURGERY: You must have a responsible adult drive you home and stay with you 24 hours after surgery  ADMITS: If your doctor is keeping you in the hospital after surgery, leave personal belongings/luggage in your car until you have a hospital room number. Hospital discharge time is 12 noon Drivers must be here before 12 noon unless you are told differently Special Instructions Bring vaccine card day of surgery Follow all instructions so your surgery wont be cancelled. Please, be on time. If a situation occurs and you are delayed the day of surgery, call [phone] If your physical condition changes (like a fever, cold, flu, etc.) call your surgeon. Home medication(s) reviewed and verified via   PHONE      during PAT appointment. The patient was contacted by  PHONE The patient verbalizes understanding of all instructions and      DOES NOT   need reinforcement.

## 2021-06-16 ENCOUNTER — ANESTHESIA (OUTPATIENT)
Dept: SURGERY | Age: 36
End: 2021-06-16
Payer: COMMERCIAL

## 2021-06-16 ENCOUNTER — HOSPITAL ENCOUNTER (OUTPATIENT)
Age: 36
Setting detail: OUTPATIENT SURGERY
Discharge: HOME | End: 2021-06-16
Attending: OBSTETRICS & GYNECOLOGY | Admitting: OBSTETRICS & GYNECOLOGY
Payer: COMMERCIAL

## 2021-06-16 VITALS
RESPIRATION RATE: 15 BRPM | DIASTOLIC BLOOD PRESSURE: 71 MMHG | HEART RATE: 66 BPM | HEIGHT: 62 IN | SYSTOLIC BLOOD PRESSURE: 120 MMHG | WEIGHT: 138.01 LBS | OXYGEN SATURATION: 100 % | TEMPERATURE: 97.5 F | BODY MASS INDEX: 25.4 KG/M2

## 2021-06-16 DIAGNOSIS — N93.9 ABNORMAL UTERINE BLEEDING: ICD-10-CM

## 2021-06-16 DIAGNOSIS — D21.9 MYOMA: ICD-10-CM

## 2021-06-16 LAB
ERYTHROCYTE [DISTWIDTH] IN BLOOD BY AUTOMATED COUNT: 11 % (ref 11.5–14.5)
HCG UR QL: NEGATIVE
HCT VFR BLD AUTO: 38.9 % (ref 35–47)
HGB BLD-MCNC: 13 G/DL (ref 11.5–16)
MCH RBC QN AUTO: 32.3 PG (ref 26–34)
MCHC RBC AUTO-ENTMCNC: 33.4 G/DL (ref 30–36.5)
MCV RBC AUTO: 96.5 FL (ref 80–99)
NRBC # BLD: 0 K/UL (ref 0–0.01)
NRBC BLD-RTO: 0 PER 100 WBC
PLATELET # BLD AUTO: 176 K/UL (ref 150–400)
PMV BLD AUTO: 11.2 FL (ref 8.9–12.9)
RBC # BLD AUTO: 4.03 M/UL (ref 3.8–5.2)
WBC # BLD AUTO: 6.4 K/UL (ref 3.6–11)

## 2021-06-16 PROCEDURE — 88305 TISSUE EXAM BY PATHOLOGIST: CPT

## 2021-06-16 PROCEDURE — 74011250637 HC RX REV CODE- 250/637: Performed by: ANESTHESIOLOGY

## 2021-06-16 PROCEDURE — 74011250636 HC RX REV CODE- 250/636: Performed by: ANESTHESIOLOGY

## 2021-06-16 PROCEDURE — 76060000062 HC AMB SURG ANES 1 TO 1.5 HR: Performed by: OBSTETRICS & GYNECOLOGY

## 2021-06-16 PROCEDURE — 2709999900 HC NON-CHARGEABLE SUPPLY: Performed by: OBSTETRICS & GYNECOLOGY

## 2021-06-16 PROCEDURE — 74011000272 HC RX REV CODE- 272: Performed by: OBSTETRICS & GYNECOLOGY

## 2021-06-16 PROCEDURE — 77030020143 HC AIRWY LARYN INTUB CGAS -A: Performed by: ANESTHESIOLOGY

## 2021-06-16 PROCEDURE — 76210000040 HC AMBSU PH I REC FIRST 0.5 HR: Performed by: OBSTETRICS & GYNECOLOGY

## 2021-06-16 PROCEDURE — 77030040830 HC CATH URETH FOL MDII -A: Performed by: OBSTETRICS & GYNECOLOGY

## 2021-06-16 PROCEDURE — 81025 URINE PREGNANCY TEST: CPT

## 2021-06-16 PROCEDURE — 77030019905 HC CATH URETH INTMIT MDII -A: Performed by: OBSTETRICS & GYNECOLOGY

## 2021-06-16 PROCEDURE — 77030040922 HC BLNKT HYPOTHRM STRY -A

## 2021-06-16 PROCEDURE — 85027 COMPLETE CBC AUTOMATED: CPT

## 2021-06-16 PROCEDURE — 74011000250 HC RX REV CODE- 250: Performed by: OBSTETRICS & GYNECOLOGY

## 2021-06-16 PROCEDURE — 76030000001 HC AMB SURG OR TIME 1 TO 1.5: Performed by: OBSTETRICS & GYNECOLOGY

## 2021-06-16 PROCEDURE — C1782 MORCELLATOR: HCPCS | Performed by: OBSTETRICS & GYNECOLOGY

## 2021-06-16 PROCEDURE — 36415 COLL VENOUS BLD VENIPUNCTURE: CPT

## 2021-06-16 PROCEDURE — 74011000250 HC RX REV CODE- 250: Performed by: ANESTHESIOLOGY

## 2021-06-16 PROCEDURE — 77030032151 HC HYSTSCP FLD MGMT KT DISP S&N -D: Performed by: OBSTETRICS & GYNECOLOGY

## 2021-06-16 PROCEDURE — 77030018723 HC ELCTRD BLD COVD -A: Performed by: OBSTETRICS & GYNECOLOGY

## 2021-06-16 PROCEDURE — 77030040361 HC SLV COMPR DVT MDII -B

## 2021-06-16 PROCEDURE — 76210000046 HC AMBSU PH II REC FIRST 0.5 HR: Performed by: OBSTETRICS & GYNECOLOGY

## 2021-06-16 RX ORDER — CEFAZOLIN SODIUM 1 G/3ML
INJECTION, POWDER, FOR SOLUTION INTRAMUSCULAR; INTRAVENOUS AS NEEDED
Status: DISCONTINUED | OUTPATIENT
Start: 2021-06-16 | End: 2021-06-16 | Stop reason: HOSPADM

## 2021-06-16 RX ORDER — FENTANYL CITRATE 50 UG/ML
INJECTION, SOLUTION INTRAMUSCULAR; INTRAVENOUS AS NEEDED
Status: DISCONTINUED | OUTPATIENT
Start: 2021-06-16 | End: 2021-06-16 | Stop reason: HOSPADM

## 2021-06-16 RX ORDER — SODIUM CHLORIDE, SODIUM LACTATE, POTASSIUM CHLORIDE, CALCIUM CHLORIDE 600; 310; 30; 20 MG/100ML; MG/100ML; MG/100ML; MG/100ML
125 INJECTION, SOLUTION INTRAVENOUS CONTINUOUS
Status: DISCONTINUED | OUTPATIENT
Start: 2021-06-16 | End: 2021-06-16 | Stop reason: HOSPADM

## 2021-06-16 RX ORDER — FLUMAZENIL 0.1 MG/ML
0.2 INJECTION INTRAVENOUS
Status: DISCONTINUED | OUTPATIENT
Start: 2021-06-16 | End: 2021-06-16 | Stop reason: HOSPADM

## 2021-06-16 RX ORDER — DIPHENHYDRAMINE HYDROCHLORIDE 50 MG/ML
12.5 INJECTION, SOLUTION INTRAMUSCULAR; INTRAVENOUS AS NEEDED
Status: DISCONTINUED | OUTPATIENT
Start: 2021-06-16 | End: 2021-06-16 | Stop reason: HOSPADM

## 2021-06-16 RX ORDER — SCOLOPAMINE TRANSDERMAL SYSTEM 1 MG/1
1 PATCH, EXTENDED RELEASE TRANSDERMAL
Status: DISCONTINUED | OUTPATIENT
Start: 2021-06-16 | End: 2021-06-16 | Stop reason: HOSPADM

## 2021-06-16 RX ORDER — SODIUM CHLORIDE, SODIUM LACTATE, POTASSIUM CHLORIDE, CALCIUM CHLORIDE 600; 310; 30; 20 MG/100ML; MG/100ML; MG/100ML; MG/100ML
INJECTION, SOLUTION INTRAVENOUS
Status: DISCONTINUED | OUTPATIENT
Start: 2021-06-16 | End: 2021-06-16 | Stop reason: HOSPADM

## 2021-06-16 RX ORDER — HYDROMORPHONE HYDROCHLORIDE 1 MG/ML
.25-1 INJECTION, SOLUTION INTRAMUSCULAR; INTRAVENOUS; SUBCUTANEOUS
Status: DISCONTINUED | OUTPATIENT
Start: 2021-06-16 | End: 2021-06-16 | Stop reason: HOSPADM

## 2021-06-16 RX ORDER — NALOXONE HYDROCHLORIDE 0.4 MG/ML
0.2 INJECTION, SOLUTION INTRAMUSCULAR; INTRAVENOUS; SUBCUTANEOUS
Status: DISCONTINUED | OUTPATIENT
Start: 2021-06-16 | End: 2021-06-16 | Stop reason: HOSPADM

## 2021-06-16 RX ORDER — FERRIC SUBSULFATE 20-22G/100
SOLUTION, NON-ORAL MISCELLANEOUS AS NEEDED
Status: DISCONTINUED | OUTPATIENT
Start: 2021-06-16 | End: 2021-06-16 | Stop reason: HOSPADM

## 2021-06-16 RX ORDER — LIDOCAINE HYDROCHLORIDE 20 MG/ML
INJECTION, SOLUTION EPIDURAL; INFILTRATION; INTRACAUDAL; PERINEURAL AS NEEDED
Status: DISCONTINUED | OUTPATIENT
Start: 2021-06-16 | End: 2021-06-16 | Stop reason: HOSPADM

## 2021-06-16 RX ORDER — KETOROLAC TROMETHAMINE 30 MG/ML
INJECTION, SOLUTION INTRAMUSCULAR; INTRAVENOUS AS NEEDED
Status: DISCONTINUED | OUTPATIENT
Start: 2021-06-16 | End: 2021-06-16 | Stop reason: HOSPADM

## 2021-06-16 RX ORDER — DEXAMETHASONE SODIUM PHOSPHATE 4 MG/ML
INJECTION, SOLUTION INTRA-ARTICULAR; INTRALESIONAL; INTRAMUSCULAR; INTRAVENOUS; SOFT TISSUE AS NEEDED
Status: DISCONTINUED | OUTPATIENT
Start: 2021-06-16 | End: 2021-06-16 | Stop reason: HOSPADM

## 2021-06-16 RX ORDER — IBUPROFEN 600 MG/1
600 TABLET ORAL
Qty: 30 TABLET | Refills: 0 | Status: SHIPPED | OUTPATIENT
Start: 2021-06-16 | End: 2021-11-10

## 2021-06-16 RX ORDER — PROPOFOL 10 MG/ML
INJECTION, EMULSION INTRAVENOUS AS NEEDED
Status: DISCONTINUED | OUTPATIENT
Start: 2021-06-16 | End: 2021-06-16 | Stop reason: HOSPADM

## 2021-06-16 RX ORDER — MIDAZOLAM HYDROCHLORIDE 1 MG/ML
INJECTION, SOLUTION INTRAMUSCULAR; INTRAVENOUS AS NEEDED
Status: DISCONTINUED | OUTPATIENT
Start: 2021-06-16 | End: 2021-06-16 | Stop reason: HOSPADM

## 2021-06-16 RX ORDER — ONDANSETRON 2 MG/ML
INJECTION INTRAMUSCULAR; INTRAVENOUS AS NEEDED
Status: DISCONTINUED | OUTPATIENT
Start: 2021-06-16 | End: 2021-06-16 | Stop reason: HOSPADM

## 2021-06-16 RX ORDER — LIDOCAINE HYDROCHLORIDE 10 MG/ML
0.1 INJECTION, SOLUTION EPIDURAL; INFILTRATION; INTRACAUDAL; PERINEURAL AS NEEDED
Status: DISCONTINUED | OUTPATIENT
Start: 2021-06-16 | End: 2021-06-16 | Stop reason: HOSPADM

## 2021-06-16 RX ADMIN — FENTANYL CITRATE 50 MCG: 0.05 INJECTION, SOLUTION INTRAMUSCULAR; INTRAVENOUS at 10:50

## 2021-06-16 RX ADMIN — PROPOFOL 125 MG: 10 INJECTION, EMULSION INTRAVENOUS at 10:50

## 2021-06-16 RX ADMIN — MIDAZOLAM 2 MG: 1 INJECTION, SOLUTION INTRAMUSCULAR; INTRAVENOUS at 10:37

## 2021-06-16 RX ADMIN — KETOROLAC TROMETHAMINE 30 MG: 30 INJECTION, SOLUTION INTRAMUSCULAR; INTRAVENOUS at 11:57

## 2021-06-16 RX ADMIN — PROPOFOL 150 MCG/KG/MIN: 10 INJECTION, EMULSION INTRAVENOUS at 10:52

## 2021-06-16 RX ADMIN — ONDANSETRON HYDROCHLORIDE 4 MG: 2 SOLUTION INTRAMUSCULAR; INTRAVENOUS at 11:47

## 2021-06-16 RX ADMIN — SODIUM CHLORIDE, POTASSIUM CHLORIDE, SODIUM LACTATE AND CALCIUM CHLORIDE 125 ML/HR: 600; 310; 30; 20 INJECTION, SOLUTION INTRAVENOUS at 09:15

## 2021-06-16 RX ADMIN — FENTANYL CITRATE 25 MCG: 0.05 INJECTION, SOLUTION INTRAMUSCULAR; INTRAVENOUS at 11:03

## 2021-06-16 RX ADMIN — FENTANYL CITRATE 25 MCG: 0.05 INJECTION, SOLUTION INTRAMUSCULAR; INTRAVENOUS at 11:40

## 2021-06-16 RX ADMIN — DEXAMETHASONE SODIUM PHOSPHATE 8 MG: 4 INJECTION, SOLUTION INTRAMUSCULAR; INTRAVENOUS at 11:07

## 2021-06-16 RX ADMIN — LIDOCAINE HYDROCHLORIDE 80 MG: 20 INJECTION, SOLUTION INTRAVENOUS at 10:50

## 2021-06-16 RX ADMIN — SODIUM CHLORIDE, POTASSIUM CHLORIDE, SODIUM LACTATE AND CALCIUM CHLORIDE: 600; 310; 30; 20 INJECTION, SOLUTION INTRAVENOUS at 10:43

## 2021-06-16 RX ADMIN — CEFAZOLIN SODIUM 2 G: 1 POWDER, FOR SOLUTION INTRAMUSCULAR; INTRAVENOUS at 11:00

## 2021-06-16 NOTE — H&P
Gynecology History and Physical    Name: Brandon Martinez MRN: [id number] SSN: [id number]    YOB: 1985  Age: 28 y.o. Sex: female       Subjective:      Chief complaint:  Abnormal uterine bleeding    Marietta Trevino is a 28 y.o. 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 Previous workup included ultrasound. US:  TRANSVAGINAL ULTRASOUND PERFORMED  UTERUS IS ANTEVERTED, NORMAL IN SIZE AND ECHOGENICITY. THE CERVIX APPEARS TO HAVE A SOLID MASS WITH BLOODFLOW THAT MEASURES 3.5 X 1.7 X 1.9CM. ENDOMETRIUM MEASURES 9-10MM IN THICKNESS. NO EVIDENCE OF MASSES OR ABNORMALITIES ARE SEEN. RIGHT OVARY APPEARS WITHIN NORMAL LIMITS. LEFT OVARY APPEARS TO HAVE A COMPLEX MASS WITH BLOODFLOW IN THE PERIPHERY THAT MEASURES 2.1 X  1.3 X 1.2CM. THIS MAY BE HEMORRHAGIC. FREE FLUID SEEN IN THE CDS. Previous treatment measures included observation. She is admitted for Procedure(s) (LRB):  HYSTEROSCOPY DILITATION AND CURETTAGE MYOMECTOMY WITH TRUCLEAR (N/A). Mammogram results:  Results from [place] encounter on 04/18/18    [facility] MAMMO BI DX INCL CAD    Narrative  STUDY:  Bilateral Diagnostic Digital Mammography and right breast Ultrasound    INDICATION:  Right lateral breast firmness and pain from 9-12 o'clock. No focal  lump. VIEWS OBTAINED: Bilateral CC, MLO, right mL, right CC and MLO spots    COMPARISON:  None. Baseline. BREAST COMPOSITION: The breast tissue is heterogeneously dense, which could  obscure detection of small masses (approximately 51-75% glandular). FINDINGS:    Mammography:  Bilateral digital diagnostic mammography was performed, and is  interpreted in conjunction with a computer assisted detection (CAD) system.   There is no mammographic abnormality to correspond to the area of firmness and  pain throughout the upper outer quadrant of the right breast. No suspicious  masses or microcalcifications are seen in either breast.    Ultrasound: Ultrasound was performed in the area of clinical concern  demonstrating normal dense fibroglandular tissue. No suspicious solid or cystic  mass. Impression  IMPRESSION:    1. BI-RADS Assessment Category 1: Negative. Recommendations:  Recommend routine screening annual screening mammography starting at age 36  unless clinically indicated earlier. The patient has been notified of these results and recommendations. OB History        3    Para   1    Term           1    AB   2    Living   1       SAB   1    TAB   1    Ectopic   0    Molar        Multiple   0    Live Births              Obstetric Comments   Menarche:  10. LMP: 3/26/18. # of Children:  2. Age at Delivery of First Child:  21.   Hysterectomy/oophorectomy:  NO/NO. Breast Bx:  no.  Hx of Breast Feeding:  yes. BCP:  yes.  Hormone therapy:  no.                Past Medical History:   Diagnosis Date    Abnormal Pap smear 2019    HGSIL     1/15    Chlamydia     not since [facility] 10th grade    ANTHONY I (cervical intraepithelial neoplasia I) 2019    ANTHONY III (cervical intraepithelial neoplasia grade III) with severe dysplasia 2021    Encounter for insertion of mirena IUD 2/23/15    removal in 5 years   Abby Whitmore Gonorrhea     last episode in [facility] 10 th grade age 12 tx received    Pap smear for cervical cancer screening 16    Neg,HPV Neg    Pap smear for cervical cancer screening 2020    ASCUS, HPV positive    Psychiatric problem     depression in [facility], currently seeing someone at [facility] 20     Past Surgical History:   Procedure Laterality Date    HX COLPOSCOPY  2019    ANTHONY I, benign    HX COLPOSCOPY  01/15/2021    [address], ectocx:CIN2    HX HERNIA REPAIR  10/02/2020    Eduardo Strickland MD    HX LEEP PROCEDURE  2021    ANTHONY III    HX LIPECTOMY  2020    Dr. Evelyn Lemons Not on file   Tobacco Use    Smoking status: Former Smoker    Smokeless tobacco: Never Used   Substance and Sexual Activity    Alcohol use: Yes     Alcohol/week: 1.0 standard drinks     Types: 1 Standard drinks or equivalent per week    Drug use: No    Sexual activity: Yes     Partners: Female     Birth control/protection: None     Comment: female only partners     Family History   Problem Relation Age of Onset    Headache Mother     Migraines Mother     Hypertension Mother     Arthritis-osteo Maternal Aunt     Alcohol abuse Maternal Grandfather     Cancer Paternal Grandmother     Breast Cancer Paternal Grandmother         31-43    Asthma Paternal Grandfather      There are no active hospital problems to display for this patient. Allergies   Allergen Reactions    Latex, Natural Rubber Swelling    Clindamycin Hives     Prior to Admission medications    Medication Sig Start Date End Date Taking? Authorizing Provider   hydrocortisone (HYTONE) 2.5 % topical cream APPLY TO AFFECTED AREA OF FACE TWICE A DAY AS NEEDED 3/1/21   Provider, Historical   multivitamin (ONE A DAY) tablet Take 1 Tab by mouth daily. Provider, Historical        Review of Systems:  A comprehensive review of systems was negative except for that written in the History of Present Illness. Objective: There were no vitals filed for this visit. Physical Exam:  Patient without distress.   Heart: Regular rate and rhythm or S1S2 present  Lung: clear to auscultation throughout lung fields, no wheezes, no rales, no rhonchi and normal respiratory effort  Abdomen: soft, nontender  Extremities, lower: no c/t/e bilaterally  Pelvic exam: defferred to OR    Lab Results   Component Value Date/Time    WBC 6.6 2021 07:19 AM    RBC 3.86 2021 07:19 AM    HGB 12.3 2021 07:19 AM    HCT 36.2 2021 07:19 AM    PLATELET 622  07:19 AM         Assessment:   28 y.o.     Abnormal uterine bleeding with cervical mass consistent with prolapsing myoma. SP LEEP    Past Medical History:   Diagnosis Date    Abnormal Pap smear 2019    HGSIL     1/15    Chlamydia     not since [facility] 10th grade    ANTHONY I (cervical intraepithelial neoplasia I) 2019    ANTHONY III (cervical intraepithelial neoplasia grade III) with severe dysplasia 2021    Encounter for insertion of mirena IUD 2/23/15    removal in 5 years   Eugenie Glaser Gonorrhea     last episode in [facility] 10 th grade age 12 tx received    Pap smear for cervical cancer screening 16    Neg,HPV Neg    Pap smear for cervical cancer screening 2020    ASCUS, HPV positive    Psychiatric problem     depression in [facility], currently seeing someone at [facility] 20       Plan:     Procedure(s) (LRB):  HYSTEROSCOPY DILITATION AND CURETTAGE MYOMECTOMY WITH TRUCLEAR (N/A)  Discussed the risks of surgery including the risks of bleeding, infection, deep vein thrombosis, and surgical injuries to internal organs including but not limited to the bowels, bladder, rectum, and female reproductive organs. The patient understands the risks; any and all questions were answered to the patient's satisfaction. Patient desires surgical intervention. All questions were answered.     Signed By:  Kristin Dolan MD     2021

## 2021-06-16 NOTE — PERIOP NOTES
Reviewed D/C at length with pt and girlfriend. Pt continues to ask questions about when it's safe to drink alcohol, educated on rationale for such. Pt educated too on Scop patch. Questions/concerns denied.

## 2021-06-16 NOTE — DISCHARGE INSTRUCTIONS
[facility]  [email or web address]  [phone]    Jojo Johnson MD, FACOG         POSTOPERATIVE INSTRUCTIONS  FOR MYOMECTOMY  FROM YOUR PHYSICIAN    Your recovery from this procedure should be relatively quick and uneventful. There are a few points that we ask you to remember: You may resume your usual diet once the nausea resolves. Initially, try sips of warm fluids and a bland diet. Gradually increase your activity. First, try walking and doing light activity. Resume your normal habits if no significant discomfort or bleeding develops. Most women can return to normal activity and work within one to two days after this procedure. Absolutely no intercourse, douching or tampon use for two weeks. Because I was unable to remove the entire fibroid/myoma, I will contact you today or tomorrow to discuss our next options. Do not drive if you are taking narcotic pain medication or cannot slam on the brakes in case of emergency. You can expect to have some vaginal bleeding or bloody vaginal discharge for the next 2 to 4 weeks. You may take tub baths after one week and showers at any time. Notify your physician if you experience:     a.  heavy vaginal bleeding or foul vaginal discharge    b.  temperature of 101° or greater    c.  severe pelvic or vaginal pain    Please call the office as soon as possible at [phone] to schedule your postoperative follow-up appointment in 6 weeks to evaluate your symptoms and bleeding, depending on what the next step is. For pain or cramping, you make take Ibuprofen or another NSAID. You may also use a heating pad or warm compress. You may also have a pain medication prescribed from your physician. Please read the instructions that come with any prescription. Contact the office if you have any questions or concerns.       You may obtain the results from any tissue sent to the lab at the time of your surgery within two weeks of your procedure. If you have not been contacted within this time frame, please contact our office. The details of the pathology will be reviewed at your postoperative appointment. Above all, please notify us of any problems, concerns, or questions and thank you for allowing us to participate in your healthcare. In an emergency, you may contact a doctor 24 hours a day at [phone]. Additional Discharge Instructions    Please read all of your discharge instructions  Follow all of your medication instructions carefully  Call our office on the next business day to schedule your follow-up appointment  If you have any questions or concerns, please contact us at [phone] or if the situation is urgent contact [phone]  Become a [employer] My Chart user so you can access information, results and appointments: go to [email or web address]. The [place] [address] is to bring compassion to healthcare and to be good help to those in need. We aim at providing quality healthcare with an emphasis on respect, justice, compassion, stewardship, integrity, growth and innovation. If you did not receive excellent communication, compassionate care and an outstanding patient experience, please notify Ekaterina Burnett at [email or web address] or [phone] or discuss your concerns with me at your next visit so that we can always meet our mission and your expectations      Tyesha Larose MD  [address] [place] [address], [address]  [email or web address]   [phone]   Good Help to Those in [place] from your Nurse    The following personal items collected during your admission are returned to you:   Dental Appliance: Dental Appliances: None  Vision: Visual Aid: Glasses, At home  Hearing Aid:    Jewelry: Jewelry: Earrings (gold [place] hoop earring in left ear)  Clothing: Clothing:  Footwear, Undergarments, Kaylan Campbell  Other Valuables: Other Valuables: Purse, Cell Phone  Valuables sent to safe:      PATIENT INSTRUCTIONS:    After general anesthesia or intravenous sedation, for 24 hours or while taking prescription Narcotics:  · Limit your activities  · Do not drive and operate hazardous machinery  · Do not make important personal or business decisions  · Do  not drink alcoholic beverages  · If you have not urinated within 8 hours after discharge, please contact your surgeon on call. Report the following to your surgeon:  · Excessive pain, swelling, redness or odor of or around the surgical area  · Temperature over 100.5  · Nausea and vomiting lasting longer than 4 hours or if unable to take medications  · Any signs of decreased circulation or nerve impairment to extremity: change in color, persistent  numbness, tingling, coldness or increase pain  · Any questions    COUGH AND DEEP BREATHE    Breathing deep and coughing are very important exercises to do after surgery. Deep breathing and coughing open the little air tubes and air sacks in your lungs. You take deep breaths every day. You may not even notice - it is just something you do when you sigh or yawn. It is a natural exercise you do to keep these air passages open. After surgery, take deep breaths and cough, on purpose. Coughing and deep breathing help prevent bronchitis and pneumonia after surgery. If you had chest or belly surgery, use a pillow as a \"hug buddy\" and hold it tightly to your chest or belly when you cough. DIRECTIONS:  · Take 10 to 15 slow deep breaths every hour while awake. · Breathe in deeply, and hold it for 2 seconds. · Exhale slowly through puckered lips, like blowing up a balloon. · After every 4th or 5th deep breath, hug your pillow to your chest or belly and give a hard, deep cough. Yes, it will probably hurt. But doing this exercise is very important part of healing after surgery.   Take your pain medicine to help you do this exercise without too much pain. IF YOU HAVE BEEN DIAGNOSED WITH SLEEP APNEA, PLEASE USE YOUR SLEEP APNEA DEVICE OR CPAP MACHINE WHEN YOU INTEND TO NAP AFTER TAKING PAIN MEDICATION. Ankle Pumps    Ankle pumps increase the circulation of oxygenated blood to your lower extremities and decrease your risk for circulation problems such as blood clots. They also stretch the muscles, tendons and ligaments in your foot and ankle, and prevent joint contracture in the ankle and foot, especially after surgeries on the legs. It is important to do ankle pump exercises regularly after surgery because immobility increases your risk for developing a blood clot. Your doctor may also have you take an Aspirin for the next few days as well. If your doctor did not ask you to take an Aspirin, consult with him before starting Aspirin therapy on your own. Slowly point your foot forward, feeling the muscles on the top of your lower leg stretch, and hold this position for 5 seconds. Next, pull your foot back toward you as far as possible, stretching the calf muscles, and hold that position for 5 seconds. Repeat with the other foot. Perform 10 repetitions every hour while awake for both ankles if possible (down and then up with the foot once is one repetition). You should feel gentle stretching of the muscles in your lower leg when doing this exercise. If you feel pain, or your range of motion is limited, don't  Push too hard. Only go the limit your joint and muscles will let you go. If you have increasing pain, progressively worsening leg warmth or swelling, STOP the exercise and call your doctor.      Below is information about the medications your doctor is prescribing after your visit:    Other information in your discharge envelope:  []     PRESCRIPTIONS  []     SCHOOL/WORK NOTE  []     INFECTION PREVENTION  []     Jeff Mejía  [] REGIONAL NERVE BLOCK ON QUE PAMPHLET   []     EXPAREL  []     HANDICAP APPLICATION         These are general instructions for a healthy lifestyle:    *  Please give a list of your current medications to your Primary Care Provider. *  Please update this list whenever your medications are discontinued, doses are      changed, or new medications (including over-the-counter products) are added. *  Please carry medication information at all times in case of emergency situations. About Smoking  No smoking / No tobacco products / Avoid exposure to second hand smoke    Surgeon General's Warning:  Quitting smoking now greatly reduces serious risk to your health. Obesity, smoking, and sedentary lifestyle greatly increases your risk for illness and disease. A healthy diet, regular physical exercise & weight monitoring are important for maintaining a healthy lifestyle. Congestive Heart Failure  You may be retaining fluid if you have a history of heart failure or if you experience any of the following symptoms:  Weight gain of 3 pounds or more overnight or 5 pounds in a week, increased swelling in our hands or feet or shortness of breath while lying flat in bed. Please call your doctor as soon as you notice any of these symptoms; do not wait until your next office visit. Recognize signs and symptoms of STROKE:  F - face looks uneven  A - arms unable to move or move even  S - speech slurred or non-existent  T - time-call 911 as soon as signs and symptoms begin-DO NOT go         Back to bed or wait to see if you get better-TIME IS BRAIN. Warning signs of HEART ATTACK  Call 911 if you have these symptoms    · Chest discomfort. Most heart attacks involve discomfort in the center of the chest that lasts more than a few minutes, or that goes away and comes back. It can feel like uncomfortable pressure, squeezing, fullness, or pain. · Discomfort in other areas of the upper body.        Symptoms can include pain or discomfort in one or both        Arms, the back, neck, jaw, or stomach. ·  Shortness of breath with or without chest discomfort. · Other signs may include breaking out in a cold sweat, nausea, or lightheadedness    Don't wait more than five minutes to call 911 - MINUTES MATTER! Fast action can save your life. Calling 911 is almost always the fastest way to get lifesaving treatment. Emergency Medical Services staff can begin treatment when they arrive - up to an hour sooner than if someone gets to the hospital by car. [facility] MEDICATION AND SIDE EFFECT GUIDE    The [employer] MEDICATION AND SIDE EFFECT GUIDE was provided to the PATIENT AND CARE PROVIDER.   Information provided includes instruction about drug purpose and common side effects for the following medications:    · Motrin

## 2021-06-16 NOTE — BRIEF OP NOTE
Brief Postoperative Note    Patient: Jalil Poe  YOB: 1985  MRN: [id number]    Date of Procedure: 6/16/2021     Pre-Op Diagnosis: MYOMA, ABNORMAL UTERINE BLEEDING    Post-Op Diagnosis: Same as preoperative diagnosis. Persistent myoma with broad base extending to fundus by digital evaluation    Procedure(s):  PARTIAL MYOMECTOMY WITH TRUCLEAR    Surgeon(s):  Alvin Colunga MD    Surgical Assistant: None    Anesthesia: General     Estimated Blood Loss (mL): 186     Complications: Other: unable to completely remove myoma    Specimens: * No specimens in log *     Implants: * No implants in log *    Drains: * No LDAs found *    Findings: 4.5 cm prolapsing myoma at cervical os.       Electronically Signed by Latonya Gallegos MD on 6/16/2021 at 10:52 AM

## 2021-06-16 NOTE — ANESTHESIA PREPROCEDURE EVALUATION
Relevant Problems   No relevant active problems       Anesthetic History     PONV (does well with scop patch)          Review of Systems / Medical History  Patient summary reviewed and pertinent labs reviewed    Pulmonary  Within defined limits                 Neuro/Psych   Within defined limits           Cardiovascular                  Exercise tolerance: >4 METS     GI/Hepatic/Renal  Within defined limits              Endo/Other  Within defined limits           Other Findings            Physical Exam    Airway  Mallampati: I  TM Distance: 4 - 6 cm  Neck ROM: normal range of motion   Mouth opening: Normal     Cardiovascular    Rhythm: regular  Rate: normal         Dental  No notable dental hx       Pulmonary  Breath sounds clear to auscultation               Abdominal  GI exam deferred       Other Findings            Anesthetic Plan    ASA: 1  Anesthesia type: general          Induction: Intravenous  Anesthetic plan and risks discussed with: Patient

## 2021-06-16 NOTE — ANESTHESIA POSTPROCEDURE EVALUATION
Procedure(s): HYSTEROSCOPY PARTIAL MYOMECTOMY WITH TRUCLEAR. general    Anesthesia Post Evaluation      Multimodal analgesia: multimodal analgesia used between 6 hours prior to anesthesia start to PACU discharge  Patient location during evaluation: bedside  Patient participation: complete - patient participated  Level of consciousness: awake  Pain management: adequate  Airway patency: patent  Anesthetic complications: no  Cardiovascular status: acceptable  Respiratory status: acceptable  Hydration status: acceptable        INITIAL Post-op Vital signs:   Vitals Value Taken Time   /80 06/16/21 1230   Temp 36.4 °C (97.5 °F) 06/16/21 1205   Pulse 61 06/16/21 1236   Resp 14 06/16/21 1236   SpO2 100 % 06/16/21 1236   Vitals shown include unvalidated device data.

## 2021-06-17 ENCOUNTER — TELEPHONE (OUTPATIENT)
Dept: OBGYN CLINIC | Age: 36
End: 2021-06-17

## 2021-06-17 DIAGNOSIS — D21.9 MYOMA: Primary | ICD-10-CM

## 2021-06-17 DIAGNOSIS — N93.9 ABNORMAL UTERINE BLEEDING (AUB): ICD-10-CM

## 2021-06-17 NOTE — TELEPHONE ENCOUNTER
Call received at 300PM    28year old patient last seen in the office yesterday for  POSTOPERATIVE DIAGNOSIS:       Same, persistent myoma     PROCEDURE:  Partial myomectomy      Patient calling to ask MD to call her back about how the surgery went yesterday and how to proceed        Patient can be reached at [phone]    Thank you

## 2021-06-17 NOTE — OP NOTES
PARTIAL MYOMECTOMY FULL OP NOTE    Patient:  Stephanie Hugo  DATE OF PROCEDURE:  6/16/2021    PREOPERATIVE DIAGNOSIS:    Encounter Diagnoses     ICD-10-CM ICD-9-CM   1. Myoma  D21.9 215.9   2. Abnormal uterine bleeding  N93.9 626.9     POSTOPERATIVE DIAGNOSIS:  Same, persistent myoma    PROCEDURE:  Partial myomectomy    SURGEON:  Fay Arriola MD    ASSISTANT:  OR staff    ANESTHESIA: General endotracheal anesthesia    EBL: less than 200 ml    FINDINGS: 4.5 cm myoma protruding partially from the cervical os. Specimen: parts of myoma    Hysteroscopic fluid deficit: <211WM    Complications: none    PROCEDURE: The patient was placed on the operating table in the supine position. Patient was placed under anesthesia. She was prepped and draped in the usual fashion for vaginal surgery and her bladder was emptied with a straight catheterization. Time out was done to confirm the operating procedure, surgeon, patient and site. A bimanual exam revealed a small mid position uterus with a myoma protruding partially from the cervical canal.    The cervix was visualized with the aid of a bivalve vaginal speculum and grasped with an Allis tenaculum. A single toothed tenaculum was placed on the myoma. The Truclear device with dense tissue shaver was used to partially remove the myoma. A moderate amount of bleeding was noted from the myoma. Attempt was made to cross clamp the myoma at the base with two Alem clamps. It was difficult to get the clamps across the base of the myoma that appeared to splay out and attached to the uterine fundus. Attempt was made to dissect the myoma and shell out the myoma. It was difficult to identify a plane and remove the myoma. Because of difficulty completely removing myoma, bleeding from dissection surface and wide myoma base attached to uterine fundus the procedure was discontinued because of concerns with possible progression to hysterectomy during dissection.     The tissue resected from the myoma was sent to pathology. Bleeding at the dissection base was made hemostatic with pressure, electrocautery for the bovie. There was minimal bleeding at the completion of the procedure. All instruments were removed. All counts were correct. The patient went to the recovery room in satisfactory condition.       Katie Solo MD, Marcelino Ashley

## 2021-06-18 RX ORDER — ELAGOLIX AND ESTRADIOL AND NORETHISTERONE 300-1-0.5
1 KIT ORAL DAILY
Qty: 60 TABLET | Refills: 1 | Status: SHIPPED | OUTPATIENT
Start: 2021-06-18 | End: 2021-07-18

## 2021-06-18 NOTE — PROGRESS NOTES
Pathology: Fragments of leiomyoma. (include in [address] notes)  Notify pt if Treater message not read or not active.   Update PSH: Include date of procedure and procedure name (check op note prn),   MD initials performing surgery (put MD initials in comments)

## 2021-06-22 NOTE — PROGRESS NOTES
Follow up call to Ms. Hugo - Pt verified self and birth date for privacy precautions. Patient was advised of results. Ms. Bre Salas advised me Dr. Olga Styles had to stop during the procedure & was wondering if she needed to come back to complete the procedure. Pt also advised me she has taken the medication to shrink the fibroid & would like to know what to do next. I sent a message to Dr. Olga Styles.

## 2021-06-25 ENCOUNTER — TELEPHONE (OUTPATIENT)
Dept: OBGYN CLINIC | Age: 36
End: 2021-06-25

## 2021-06-25 NOTE — TELEPHONE ENCOUNTER
Follow up call to Ms. Oanh Keene Pt verified self and birth date for privacy precautions. Patient was advised of MRI referral. Patient stated that she wants to be sure if the procedure is worth getting done. Patient was advised that the nurse would follow up with MD and [facility] Ms. Rakan Kohler acknowledged understanding and all questions were answered to patients satisfaction. No further questions or concerns at this time.

## 2021-06-26 NOTE — TELEPHONE ENCOUNTER
Please notify patient that the purpose of the MRI is to better see the size and location of the myoma to plan more complete removal since it was not successful on last attempt. I will contact the patient next week to discuss in more detail. Please notify pt.     TRP

## 2021-07-02 NOTE — TELEPHONE ENCOUNTER
D/W pt. Did not start oriahnn. Bleeding very light. Interested in IR/[facility], mother had and did well. Disc risks to future pregnancy with myomectomy and [place]. Will hold on MRI and refer to IR for consult. Also disc alt referral for laparoscopic myomectomy, pt declines.      Pacheco Nails MD

## 2021-07-06 ENCOUNTER — TELEPHONE (OUTPATIENT)
Dept: OBGYN CLINIC | Age: 36
End: 2021-07-06

## 2021-07-06 NOTE — TELEPHONE ENCOUNTER
SHYAMM for someone to contact our office regarding this pt to have an [place]. They were already closed for the holiday.

## 2021-07-06 NOTE — TELEPHONE ENCOUNTER
I waited on hold for 11 minutes before I hung up with [phone]. Belén Wilson told me she has a STAT  number she will be providing me later today.

## 2021-07-06 NOTE — TELEPHONE ENCOUNTER
I did not leave a voicemail for the [phone] number because it had a generic voicemail without letting me know if this was a medical facility I am trying to reach. I did not want to leave a voicemail when I was unsure if this was the right number.

## 2021-07-16 ENCOUNTER — PATIENT MESSAGE (OUTPATIENT)
Dept: OBGYN CLINIC | Age: 36
End: 2021-07-16

## 2021-07-20 ENCOUNTER — TRANSCRIBE ORDER (OUTPATIENT)
Dept: SCHEDULING | Age: 36
End: 2021-07-20

## 2021-07-20 DIAGNOSIS — D21.9 FIBROIDS: Primary | ICD-10-CM

## 2021-07-22 ENCOUNTER — OFFICE VISIT (OUTPATIENT)
Dept: OBGYN CLINIC | Age: 36
End: 2021-07-22
Payer: COMMERCIAL

## 2021-07-22 VITALS
SYSTOLIC BLOOD PRESSURE: 113 MMHG | HEART RATE: 76 BPM | DIASTOLIC BLOOD PRESSURE: 68 MMHG | WEIGHT: 141.8 LBS | BODY MASS INDEX: 25.94 KG/M2

## 2021-07-22 DIAGNOSIS — N64.52 DISCHARGE FROM RIGHT NIPPLE: Primary | ICD-10-CM

## 2021-07-22 DIAGNOSIS — N64.4 SORENESS BREAST: ICD-10-CM

## 2021-07-22 PROCEDURE — 99213 OFFICE O/P EST LOW 20 MIN: CPT | Performed by: OBSTETRICS & GYNECOLOGY

## 2021-07-22 RX ORDER — HYDROCORTISONE 1 %
CREAM (GRAM) TOPICAL 2 TIMES DAILY
Qty: 30 G | Refills: 0 | Status: SHIPPED | OUTPATIENT
Start: 2021-07-22 | End: 2021-11-10

## 2021-07-22 NOTE — PROGRESS NOTES
[address] [place] [address] OB-GYN  [email or web address]    Morales Macedo MD, FACOG       OB/GYN Problem visit    Chief Complaint:   Chief Complaint   Patient presents with    Breast Problem       History of Present Illness: This is a new problem being evaluated by this provider. The patient is a 28 y.o.  female who reports having tenderness itchiness and nipple discharge for 1 months. She reports the symptoms are has worsened. Aggravating factors include none. Alleviating factors include none. She reports positive family history of breast cancer in her paternal grandmother. . Patient reports having been referred to a breast specialist in the past due to lumps in the breast but nothing was found cancerous. She is not breast feeding. She does not have other concerns. Last Mammogram Results:  Results from Hospital Encounter encounter on 18    [facility] MAMMO BI DX INCL CAD    Narrative  STUDY:  Bilateral Diagnostic Digital Mammography and right breast Ultrasound    INDICATION:  Right lateral breast firmness and pain from 9-12 o'clock. No focal  lump. VIEWS OBTAINED: Bilateral CC, MLO, right mL, right CC and MLO spots    COMPARISON:  None. Baseline. BREAST COMPOSITION: The breast tissue is heterogeneously dense, which could  obscure detection of small masses (approximately 51-75% glandular). FINDINGS:    Mammography:  Bilateral digital diagnostic mammography was performed, and is  interpreted in conjunction with a computer assisted detection (CAD) system. There is no mammographic abnormality to correspond to the area of firmness and  pain throughout the upper outer quadrant of the right breast. No suspicious  masses or microcalcifications are seen in either breast.    Ultrasound: Ultrasound was performed in the area of clinical concern  demonstrating normal dense fibroglandular tissue. No suspicious solid or cystic  mass. Impression  IMPRESSION:    1.  BI-RADS Assessment Category 1: Negative. Recommendations:  Recommend routine screening annual screening mammography starting at age 36  unless clinically indicated earlier. The patient has been notified of these results and recommendations. LMP: No LMP recorded. PFSH:  Past Medical History:   Diagnosis Date    Abnormal Pap smear 2019    HGSIL     1/15    Chlamydia     not since [facility] 10th grade    ANTHONY I (cervical intraepithelial neoplasia I) 2019    ANTHONY III (cervical intraepithelial neoplasia grade III) with severe dysplasia 2021    Encounter for insertion of mirena IUD 2/23/15    removal in 5 years   Michelle Manual Gonorrhea     last episode in [facility] 10 th grade age 12 tx received    Pap smear for cervical cancer screening 16    Neg,HPV Neg    Pap smear for cervical cancer screening 2020    ASCUS, HPV positive    Psychiatric problem     depression in [facility], currently seeing someone at [facility] 20     Past Surgical History:   Procedure Laterality Date    HX COLPOSCOPY  2019    ANTHONY I, benign    HX COLPOSCOPY  01/15/2021    [address], ectocx:CIN2    HX HERNIA REPAIR  10/02/2020    Guanaco Caldwell MD    HX LEEP PROCEDURE  2021    ANTHONY III    HX LIPECTOMY  2020    Dr. Guanaco Caldwell     Family History   Problem Relation Age of Onset    Headache Mother     Migraines Mother     Hypertension Mother     Arthritis-osteo Maternal Aunt     Alcohol abuse Maternal Grandfather     Cancer Paternal Grandmother     Breast Cancer Paternal Grandmother         31-43    Asthma Paternal Grandfather      Social History     Tobacco Use    Smoking status: Former Smoker    Smokeless tobacco: Never Used   Substance Use Topics    Alcohol use:  Yes     Alcohol/week: 1.0 standard drinks     Types: 1 Standard drinks or equivalent per week    Drug use: No     Allergies   Allergen Reactions    Latex, Natural Rubber Swelling    Clindamycin Hives     Current Outpatient Medications   Medication Sig    hydrocortisone (CORTAID) 1 % topical cream Apply  to affected area two (2) times a day. use thin layer    CLOBETASOL PROPIONATE, BULK, by Does Not Apply route daily. Clobetasol Propionate Topical Solution 0.05% : use a few drops daily on scalp for dandruff (Patient not taking: Reported on 7/22/2021)    ibuprofen (MOTRIN) 600 mg tablet Take 1 Tablet by mouth every six (6) hours as needed for Pain. Take with food. (Patient not taking: Reported on 7/22/2021)    hydrocortisone (HYTONE) 2.5 % topical cream APPLY TO AFFECTED AREA OF FACE TWICE A DAY AS NEEDED (Patient not taking: Reported on 7/22/2021)    multivitamin (ONE A DAY) tablet Take 1 Tab by mouth daily. (Patient not taking: Reported on 7/22/2021)     No current facility-administered medications for this visit. Review of Systems:  History obtained from the patient  Constitutional: negative for fevers, chills and weight loss  ENT ROS: negative for - hearing change, oral lesions or visual changes  Respiratory: negative for cough, wheezing or dyspnea on exertion  Cardiovascular: negative for chest pain, irregular heart beats, exertional chest pressure/discomfort  Gastrointestinal: negative for dysphagia, nausea and vomiting  Genito-Urinary ROS: no dysuria, trouble voiding, or hematuria  Inteument/breast: see HPI  Musculoskeletal:negative for stiff joints, neck pain and muscle weakness  Endocrine ROS: negative for - breast changes, galactorrhea or temperature intolerance  Hematological and Lymphatic ROS: negative for - blood clots, bruising or swollen lymph nodes    Physical Exam:  Visit Vitals  /68 (BP 1 Location: Right arm, BP Patient Position: Sitting, BP Cuff Size: Adult)   Pulse 76   Wt 141 lb 12.8 oz (64.3 kg)   BMI 25.94 kg/m²       GENERAL: alert, well appearing, and in no distress  HEAD: normocephalic, atraumatic.    NECK:  supple, no significant adenopathy, no palpable masses  PULM: clear to auscultation, no wheezes, rales or rhonchi, symmetric air entry   COR: normal rate and regular rhythm, S1 and S2 normal   BACK: no cvat  ABDOMEN: soft, nontender, nondistended, no masses or organomegaly   BREAST:   Right breast appear normal, no suspicious masses, no skin or nipple changes or axillary nodes. Clear discharged at right nipple. Left breast appear normal, no suspicious masses, no skin or nipple changes or axillary nodes  NEURO: alert, oriented, normal speech  SKIN: no breast skin changes    Assessment:  Encounter Diagnoses   Name Primary?  Discharge from right nipple Yes    Soreness breast        Plan:  The patient is advised that she should contact the office if she does not note improvement or if symptoms recur. She should contact our office with any questions or concerns. She could keep her routine annual exam appointment. We reviewed self breast exams with the patient. We recommend follow up with PCP for non-gynecologic complaints and chronic medical problems.     Topical steroid  Breast referral  [facility]      Orders Placed This Encounter    PROLACTIN    TSH 3RD GENERATION    REFERRAL TO BREAST SURGERY    hydrocortisone (CORTAID) 1 % topical cream

## 2021-07-23 LAB
PROLACTIN SERPL-MCNC: 8.5 NG/ML
TSH SERPL DL<=0.05 MIU/L-ACNC: 0.61 UIU/ML (ref 0.36–3.74)

## 2021-09-17 ENCOUNTER — HOSPITAL ENCOUNTER (OUTPATIENT)
Dept: MRI IMAGING | Age: 36
Discharge: HOME | End: 2021-09-17
Attending: STUDENT IN AN ORGANIZED HEALTH CARE EDUCATION/TRAINING PROGRAM
Payer: COMMERCIAL

## 2021-09-17 DIAGNOSIS — D21.9 FIBROIDS: ICD-10-CM

## 2021-09-17 PROCEDURE — 72197 MRI PELVIS W/O & W/DYE: CPT

## 2021-09-17 PROCEDURE — 74011250636 HC RX REV CODE- 250/636

## 2021-09-17 PROCEDURE — A9576 INJ PROHANCE MULTIPACK: HCPCS

## 2021-09-17 RX ADMIN — GADOTERIDOL 13 ML: 279.3 INJECTION, SOLUTION INTRAVENOUS at 16:45

## 2021-09-24 ENCOUNTER — TELEPHONE (OUTPATIENT)
Dept: OBGYN CLINIC | Age: 36
End: 2021-09-24

## 2021-09-24 NOTE — TELEPHONE ENCOUNTER
Call received at 9:56am      28year old patient last seen in the office on 7/22/2021     interventional radiologist calling to speak to MD regarding the patient .       Please call MD at your earliest con yady at       Thank you

## 2021-09-24 NOTE — TELEPHONE ENCOUNTER
Dw radiology. We are aware of prolpased myoma and it has been tested when we tried to remove: see path from June.       Sae Franco MD

## 2021-10-04 ENCOUNTER — HOSPITAL ENCOUNTER (OUTPATIENT)
Dept: INTERVENTIONAL RADIOLOGY/VASCULAR | Age: 36
Discharge: HOME | End: 2021-10-04
Attending: STUDENT IN AN ORGANIZED HEALTH CARE EDUCATION/TRAINING PROGRAM | Admitting: STUDENT IN AN ORGANIZED HEALTH CARE EDUCATION/TRAINING PROGRAM
Payer: COMMERCIAL

## 2021-10-04 VITALS
OXYGEN SATURATION: 100 % | TEMPERATURE: 98 F | WEIGHT: 140 LBS | HEIGHT: 62 IN | HEART RATE: 74 BPM | DIASTOLIC BLOOD PRESSURE: 76 MMHG | BODY MASS INDEX: 25.76 KG/M2 | RESPIRATION RATE: 23 BRPM | SYSTOLIC BLOOD PRESSURE: 130 MMHG

## 2021-10-04 DIAGNOSIS — D25.9 UTERINE FIBROID: ICD-10-CM

## 2021-10-04 LAB — HCG UR QL: NEGATIVE

## 2021-10-04 PROCEDURE — C1769 GUIDE WIRE: HCPCS

## 2021-10-04 PROCEDURE — 77030010221 HC SPLNT WR POS TELE -B

## 2021-10-04 PROCEDURE — 74011000258 HC RX REV CODE- 258: Performed by: STUDENT IN AN ORGANIZED HEALTH CARE EDUCATION/TRAINING PROGRAM

## 2021-10-04 PROCEDURE — 81025 URINE PREGNANCY TEST: CPT

## 2021-10-04 PROCEDURE — 2709999900 HC NON-CHARGEABLE SUPPLY

## 2021-10-04 PROCEDURE — 77030003504 HC NDL BIOP TISS COOK -A

## 2021-10-04 PROCEDURE — 74011250636 HC RX REV CODE- 250/636: Performed by: STUDENT IN AN ORGANIZED HEALTH CARE EDUCATION/TRAINING PROGRAM

## 2021-10-04 PROCEDURE — 77030019569 HC BND COMPR RAD TERU -B

## 2021-10-04 PROCEDURE — C1889 IMPLANT/INSERT DEVICE, NOC: HCPCS

## 2021-10-04 PROCEDURE — 77030019698 HC SYR ANGI MDLON MRTM -A

## 2021-10-04 PROCEDURE — 74011000250 HC RX REV CODE- 250: Performed by: STUDENT IN AN ORGANIZED HEALTH CARE EDUCATION/TRAINING PROGRAM

## 2021-10-04 PROCEDURE — C1894 INTRO/SHEATH, NON-LASER: HCPCS

## 2021-10-04 PROCEDURE — 75716 ARTERY X-RAYS ARMS/LEGS: CPT

## 2021-10-04 PROCEDURE — C1887 CATHETER, GUIDING: HCPCS

## 2021-10-04 PROCEDURE — 77030003394 HC NDL ART COOK -A

## 2021-10-04 PROCEDURE — 74011000636 HC RX REV CODE- 636: Performed by: STUDENT IN AN ORGANIZED HEALTH CARE EDUCATION/TRAINING PROGRAM

## 2021-10-04 PROCEDURE — 77030014021 HC SYR ANGI FIX LOK MRTM -A

## 2021-10-04 RX ORDER — SODIUM CHLORIDE 9 MG/ML
25 INJECTION, SOLUTION INTRAVENOUS
Status: DISCONTINUED | OUTPATIENT
Start: 2021-10-04 | End: 2021-10-04 | Stop reason: HOSPADM

## 2021-10-04 RX ORDER — LIDOCAINE HYDROCHLORIDE 20 MG/ML
20 INJECTION, SOLUTION INFILTRATION; PERINEURAL
Status: COMPLETED | OUTPATIENT
Start: 2021-10-04 | End: 2021-10-04

## 2021-10-04 RX ORDER — KETOROLAC TROMETHAMINE 30 MG/ML
30 INJECTION, SOLUTION INTRAMUSCULAR; INTRAVENOUS
Status: COMPLETED | OUTPATIENT
Start: 2021-10-04 | End: 2021-10-04

## 2021-10-04 RX ORDER — NALOXONE HYDROCHLORIDE 0.4 MG/ML
0.4 INJECTION, SOLUTION INTRAMUSCULAR; INTRAVENOUS; SUBCUTANEOUS
Status: DISCONTINUED | OUTPATIENT
Start: 2021-10-04 | End: 2021-10-04

## 2021-10-04 RX ORDER — FENTANYL CITRATE 50 UG/ML
200 INJECTION, SOLUTION INTRAMUSCULAR; INTRAVENOUS
Status: DISCONTINUED | OUTPATIENT
Start: 2021-10-04 | End: 2021-10-04

## 2021-10-04 RX ORDER — OXYCODONE AND ACETAMINOPHEN 5; 325 MG/1; MG/1
1 TABLET ORAL
Qty: 12 TABLET | Refills: 0 | Status: SHIPPED | OUTPATIENT
Start: 2021-10-04 | End: 2021-10-04

## 2021-10-04 RX ORDER — ROPIVACAINE HYDROCHLORIDE 5 MG/ML
20 INJECTION, SOLUTION EPIDURAL; INFILTRATION; PERINEURAL
Status: COMPLETED | OUTPATIENT
Start: 2021-10-04 | End: 2021-10-04

## 2021-10-04 RX ORDER — HEPARIN SODIUM 1000 [USP'U]/ML
3000 INJECTION, SOLUTION INTRAVENOUS; SUBCUTANEOUS
Status: COMPLETED | OUTPATIENT
Start: 2021-10-04 | End: 2021-10-04

## 2021-10-04 RX ORDER — ONDANSETRON 2 MG/ML
8 INJECTION INTRAMUSCULAR; INTRAVENOUS
Status: COMPLETED | OUTPATIENT
Start: 2021-10-04 | End: 2021-10-04

## 2021-10-04 RX ORDER — MIDAZOLAM HYDROCHLORIDE 1 MG/ML
5 INJECTION, SOLUTION INTRAMUSCULAR; INTRAVENOUS
Status: DISCONTINUED | OUTPATIENT
Start: 2021-10-04 | End: 2021-10-04

## 2021-10-04 RX ORDER — OXYCODONE AND ACETAMINOPHEN 5; 325 MG/1; MG/1
1 TABLET ORAL
Qty: 12 TABLET | Refills: 0 | Status: SHIPPED | OUTPATIENT
Start: 2021-10-04 | End: 2021-10-07

## 2021-10-04 RX ORDER — LIDOCAINE HCL/PF 100 MG/5ML
200 SYRINGE (ML) INTRAVENOUS
Status: COMPLETED | OUTPATIENT
Start: 2021-10-04 | End: 2021-10-04

## 2021-10-04 RX ORDER — LIDOCAINE 40 MG/G
CREAM TOPICAL
Status: COMPLETED | OUTPATIENT
Start: 2021-10-04 | End: 2021-10-04

## 2021-10-04 RX ORDER — FLUMAZENIL 0.1 MG/ML
0.5 INJECTION INTRAVENOUS
Status: DISCONTINUED | OUTPATIENT
Start: 2021-10-04 | End: 2021-10-04

## 2021-10-04 RX ADMIN — IOPAMIDOL 114 ML: 612 INJECTION, SOLUTION INTRAVENOUS at 11:06

## 2021-10-04 RX ADMIN — SODIUM CHLORIDE 25 ML/HR: 900 INJECTION, SOLUTION INTRAVENOUS at 10:00

## 2021-10-04 RX ADMIN — FENTANYL CITRATE 50 MCG: 50 INJECTION INTRAMUSCULAR; INTRAVENOUS at 10:04

## 2021-10-04 RX ADMIN — LIDOCAINE HYDROCHLORIDE 8 ML: 20 INJECTION, SOLUTION INFILTRATION; PERINEURAL at 11:06

## 2021-10-04 RX ADMIN — LIDOCAINE 4%: 4 CREAM TOPICAL at 08:58

## 2021-10-04 RX ADMIN — ROPIVACAINE HYDROCHLORIDE 20 ML: 5 INJECTION, SOLUTION EPIDURAL; INFILTRATION; PERINEURAL at 10:40

## 2021-10-04 RX ADMIN — MIDAZOLAM HYDROCHLORIDE 1 MG: 1 INJECTION, SOLUTION INTRAMUSCULAR; INTRAVENOUS at 09:58

## 2021-10-04 RX ADMIN — MIDAZOLAM HYDROCHLORIDE 1 MG: 1 INJECTION, SOLUTION INTRAMUSCULAR; INTRAVENOUS at 10:04

## 2021-10-04 RX ADMIN — NITROGLYCERIN 200 MCG: 5 INJECTION, SOLUTION INTRAVENOUS at 10:12

## 2021-10-04 RX ADMIN — LIDOCAINE HYDROCHLORIDE 200 MG: 20 INJECTION INTRAVENOUS at 10:59

## 2021-10-04 RX ADMIN — ONDANSETRON 8 MG: 2 INJECTION INTRAMUSCULAR; INTRAVENOUS at 08:58

## 2021-10-04 RX ADMIN — NITROGLYCERIN 200 MCG: 5 INJECTION, SOLUTION INTRAVENOUS at 11:04

## 2021-10-04 RX ADMIN — KETOROLAC TROMETHAMINE 30 MG: 30 INJECTION, SOLUTION INTRAMUSCULAR; INTRAVENOUS at 09:00

## 2021-10-04 RX ADMIN — FENTANYL CITRATE 25 MCG: 50 INJECTION INTRAMUSCULAR; INTRAVENOUS at 10:37

## 2021-10-04 RX ADMIN — MIDAZOLAM HYDROCHLORIDE 0.5 MG: 1 INJECTION, SOLUTION INTRAMUSCULAR; INTRAVENOUS at 10:18

## 2021-10-04 RX ADMIN — MIDAZOLAM HYDROCHLORIDE 0.5 MG: 1 INJECTION, SOLUTION INTRAMUSCULAR; INTRAVENOUS at 10:42

## 2021-10-04 RX ADMIN — HEPARIN SODIUM 3000 UNITS: 1000 INJECTION INTRAVENOUS; SUBCUTANEOUS at 11:03

## 2021-10-04 RX ADMIN — HEPARIN SODIUM 3000 UNITS: 1000 INJECTION INTRAVENOUS; SUBCUTANEOUS at 10:11

## 2021-10-04 RX ADMIN — FENTANYL CITRATE 50 MCG: 50 INJECTION INTRAMUSCULAR; INTRAVENOUS at 09:58

## 2021-10-04 RX ADMIN — WATER 2 G: 1 INJECTION INTRAMUSCULAR; INTRAVENOUS; SUBCUTANEOUS at 09:00

## 2021-10-04 NOTE — H&P
Radiology History and Physical    Patient: Zechariah Hugo 28 y.o. female       Chief Complaint: Uterine fibroids    History of Present Illness: 28year old woman presents for uterine fibroid embolization. Initial consultation was done 2021. Primary complaints of menorrhagia and dyspareunia. No changes to history. History:    Past Medical History:   Diagnosis Date    Abnormal Pap smear 2019    HGSIL     1/15    Chlamydia     not since [facility] 10th grade    ANTHONY I (cervical intraepithelial neoplasia I) 2019    ANTHONY III (cervical intraepithelial neoplasia grade III) with severe dysplasia 2021    Encounter for insertion of mirena IUD 2/23/15    removal in 5 years   Briana Spencer Gonorrhea     last episode in [facility] 10 th grade age 12 tx received    Pap smear for cervical cancer screening 16    Neg,HPV Neg    Pap smear for cervical cancer screening 2020    ASCUS, HPV positive    Psychiatric problem     depression in [facility], currently seeing someone at [facility] one 20     Family History   Problem Relation Age of Onset    Headache Mother     Migraines Mother     Hypertension Mother     Arthritis-osteo Maternal Aunt     Alcohol abuse Maternal Grandfather     Cancer Paternal Grandmother     Breast Cancer Paternal Grandmother         31-43    Asthma Paternal Grandfather      Social History     Socioeconomic History    Marital status: SINGLE     Spouse name: Not on file    Number of children: Not on file    Years of education: Not on file    Highest education level: Not on file   Occupational History    Not on file   Tobacco Use    Smoking status: Former Smoker    Smokeless tobacco: Never Used   Substance and Sexual Activity    Alcohol use:  Yes     Alcohol/week: 1.0 standard drinks     Types: 1 Standard drinks or equivalent per week    Drug use: No    Sexual activity: Yes     Partners: Female     Birth control/protection: None     Comment: female only partners   Other Topics Concern    Not on file   Social History Narrative    Not on file     Social Determinants of Health     Financial Resource Strain:     Difficulty of Paying Living Expenses:    Food Insecurity:     Worried About Running Out of Food in the Last Year:     [address] in the Last Year:    Transportation Needs:     Lack of Transportation (Medical):  Lack of Transportation (Non-Medical):    Physical Activity:     Days of Exercise per Week:     Minutes of Exercise per Session:    Stress:     Feeling of Stress :    Social Connections:     Frequency of Communication with Friends and Family:     Frequency of Social Gatherings with Friends and Family:     Attends [employer] Services:     Active Member of Clubs or Organizations:     Attends Club or Organization Meetings:     Marital Status:    Intimate Partner Violence:     Fear of Current or Ex-Partner:     Emotionally Abused:     Physically Abused:     Sexually Abused: Allergies:    Allergies   Allergen Reactions    Latex, Natural Rubber Swelling    Clindamycin Hives       Current Medications:  Current Facility-Administered Medications   Medication Dose Route Frequency    heparin (porcine) 1,000 unit/mL injection 3,000 Units  3,000 Units Other RAD ONCE    nitroglycerin 0.1 mg/mL in D5W injection  200 mcg IntraarTERial RAD ONCE    ropivacaine (PF) (NAROPIN) 5 mg/mL (0.5 %) injection 20 mL  20 mL Peripheral Nerve Block RAD ONCE    lidocaine (PF) (XYLOCAINE) 100 mg/5 mL (2 %) injection syringe 200 mg  200 mg Peripheral Nerve Block RAD ONCE    heparin 4000 units/1000 mL (4 units/mL) in NS infusion **FOR ANGIO USE ONLY**   IntraVENous RAD PRN    lidocaine (XYLOCAINE) 20 mg/mL (2 %) injection 400 mg  20 mL SubCUTAneous RAD ONCE    iopamidoL (ISOVUE 300) 61 % contrast injection 300 mL  300 mL IntraCATHeter RAD ONCE    flumazeniL (ROMAZICON) 0.1 mg/mL injection 0.5 mg  0.5 mg IntraVENous RAD PRN    midazolam (VERSED) injection 5 mg  5 mg IntraVENous Rad Multiple    fentaNYL citrate (PF) injection 200 mcg  200 mcg IntraVENous Rad Multiple    naloxone (NARCAN) injection 0.4 mg  0.4 mg IntraVENous RAD PRN    0.9% sodium chloride infusion  25 mL/hr IntraVENous RAD CONTINUOUS        Physical Exam:  Blood pressure 125/65, pulse (!) 59, temperature 98 °F (36.7 °C), resp. rate 18, height 5' 2\" (1.575 m), weight 63.5 kg (140 lb), SpO2 98 %. GENERAL: alert, cooperative, no distress, appears stated age,   LUNG: Nonlabored respiration on room air  HEART: regular rate and rhythm    Alerts:    Hospital Problems  Date Reviewed: 7/22/2021    None          Laboratory:    No results for input(s): HGB, HCT, WBC, PLT, INR, BUN, CREA, K, CRCLT, HGBEXT, HCTEXT, PLTEXT, INREXT in the last 72 hours. No lab exists for component: PTT, PT      Plan of Care/Planned Procedure:  Risks, benefits, and alternatives reviewed with patient and she agrees to proceed with the procedure. Uterine fibroid embolization. Deemed appropriate for moderate sedation with versed and fentanyl.     Edith Goldberg MD  Interventional Radiology  [facility], [facility].  9:43 AM, 10/4/2021

## 2021-10-04 NOTE — PROGRESS NOTES
Pt arrives ambulatory to angio department accompanied by girlfriend for UFE procedure. All assessments completed and consent was reviewed. Education given was regarding procedure, conscious sedation, post-procedure care and  management/follow-up. Opportunity for questions was provided and all questions and concerns were addressed.

## 2021-10-04 NOTE — DISCHARGE INSTRUCTIONS
Patient Education        Uterine Fibroid Embolization: What to Expect at Home  Your Recovery     Uterine fibroid embolization is a procedure done to destroy or shrink uterine fibroids. Your doctor put a thin, flexible tube (catheter) into blood vessels in both of your upper thighs. Then the doctor sent small particles through the catheter to prevent your fibroids from getting blood. You can expect to feel better each day. But you may get tired quickly and need pain medicine for several days. You may need about 7 to 10 days to fully recover. Many women have mild to severe cramps for several days after uterine fibroid embolization. You may also have mild nausea or a low fever for 4 or 5 days. Some women have vaginal bleeding or grayish or brownish vaginal discharge for several weeks. These are all common side effects of the treatment. Your next few menstrual cycles may be heavier than normal. Some women pass tissue for up to 3 months after the procedure. Make sure to avoid heavy lifting for about a week. This care sheet gives you a general idea about how long it will take for you to recover. But each person recovers at a different pace. Follow the steps below to get better as quickly as possible. How can you care for yourself at home? Activity    · Rest when you feel tired. Getting enough sleep will help you recover.     · Try to walk each day. Start out by walking a little more than you did the day before. Bit by bit, increase the amount you walk. Walking boosts blood flow and helps prevent pneumonia and constipation.     · For 1 week, avoid lifting anything that would make you strain. This may include a child, heavy grocery bags and milk containers, a heavy briefcase or backpack, cat litter or dog food bags, or a vacuum .     · Avoid strenuous activities, such as biking, jogging, weightlifting, and aerobic exercise, for 4 weeks.     · You may shower.  Do not take a bath for a few days or until your doctor tells you it is okay.     · You may have some vaginal bleeding. Wear sanitary pads if needed. Do not douche or use tampons.     · Ask your doctor when you can drive again.     · You will probably need to take 1 week off work. It depends on the type of work you do and how you feel.     · Your doctor will tell you when you can have sex again. Diet    · You can eat your normal diet. If your stomach is upset, try bland, low-fat foods like plain rice, broiled chicken, toast, and yogurt.     · Drink plenty of fluids (unless your doctor tells you not to).     · You may notice that your bowel movements are not regular right after your surgery. This is common. Try to avoid constipation and straining with bowel movements. You may want to take a fiber supplement every day. If you have not had a bowel movement after a couple of days, ask your doctor about taking a mild laxative. Medicines    · Your doctor will tell you if and when you can restart your medicines. He or she will also give you instructions about taking any new medicines.     · If you take aspirin or some other blood thinner, ask your doctor if and when to start taking it again. Make sure that you understand exactly what your doctor wants you to do.     · Be safe with medicines. Take pain medicines exactly as directed. ? If the doctor gave you a prescription medicine for pain, take it as prescribed. ? If you are not taking a prescription pain medicine, ask your doctor if you can take an over-the-counter medicine.     · If you think your pain medicine is making you sick to your stomach:  ? Take your medicine after meals (unless your doctor tells you not to). ? Ask your doctor for a different pain medicine.     · If your doctor prescribed antibiotics, take them as directed. Do not stop taking them just because you feel better. You need to take the full course of antibiotics.    Other instructions    · Wear loose, comfortable clothing and avoid anything that puts pressure on your belly for a few days.     · You may want to use a heating pad on your belly to help with pain. Follow-up care is a key part of your treatment and safety. Be sure to make and go to all appointments, and call your doctor if you are having problems. It's also a good idea to know your test results and keep a list of the medicines you take. When should you call for help? Call 911 anytime you think you may need emergency care. For example, call if:    · You passed out (lost consciousness).     · You have chest pain, are short of breath, or cough up blood. Call your doctor now or seek immediate medical care if:    · You have bright red vaginal bleeding that soaks one or more pads in an hour, or you have large clots.     · You are sick to your stomach or cannot drink fluids.     · You have vaginal discharge that has increased in amount or smells bad.     · You have signs of infection, such as:  ? Increased pain, swelling, warmth, or redness. ? A fever.     · You have signs of a blood clot in your leg (called a deep vein thrombosis), such as:  ? Pain in your calf, back of the knee, thigh, or groin. ? Redness and swelling in your leg or groin.     · You have pain that does not get better after you take pain medicine.     · You are bleeding from the area where the catheter was put in your artery.     · You have a fast-growing, painful lump at the catheter site. Watch closely for any changes in your health, and be sure to contact your doctor if you have any problems. Side effects of sedation medications and other medications used today have been reviewed. Notify us of nausea, itching, hives, dizziness, or anything else out of the ordinary. Should you experience any of these significant changes, please call [phone] between the hours of 7:30 am and 10 pm or [phone] after hours.  After hours, ask the  to page the X-ray Technologist, and describe the problem to the technologist.

## 2021-10-04 NOTE — PROCEDURES
Interventional Radiology  Procedure Note        10/4/2021 11:26 AM    Patient: Riki Ramachandran     Informed consent obtained    Diagnosis: Uterine fibroids    Procedure(s): Bilateral uterine artery embolization; hypogastric nerve block    Specimens removed:  none    Complications: None    Primary Physician: Williams Roberts MD    Recommendations: N/A    Discharge Disposition: Stable; discharge to home    Full dictated report to follow    Williams Roberts MD  Interventional Radiology  [address] [place] [address] [facility]  11:26 AM, 10/4/2021

## 2021-10-04 NOTE — PROGRESS NOTES
Discharge paperwork given to patient by this RN. All questions and concerns addressed. Patient was at baseline orientation. IV D/C'd. Site CDI. Patient wheeled off unit to POV, steady gate noted. TR band successfully removed and hemostasis achieved.

## 2021-11-10 ENCOUNTER — OFFICE VISIT (OUTPATIENT)
Dept: OBGYN CLINIC | Age: 36
End: 2021-11-10

## 2021-11-10 VITALS
DIASTOLIC BLOOD PRESSURE: 90 MMHG | BODY MASS INDEX: 26.16 KG/M2 | SYSTOLIC BLOOD PRESSURE: 143 MMHG | HEART RATE: 108 BPM | WEIGHT: 143 LBS

## 2021-11-10 DIAGNOSIS — Z98.890 H/O LEEP: ICD-10-CM

## 2021-11-10 DIAGNOSIS — N89.8 VAGINAL DISCHARGE: ICD-10-CM

## 2021-11-10 DIAGNOSIS — N93.9 ABNORMAL UTERINE BLEEDING: Primary | ICD-10-CM

## 2021-11-10 DIAGNOSIS — D21.9 MYOMA: ICD-10-CM

## 2021-11-10 DIAGNOSIS — D06.9 HIGH GRADE SQUAMOUS INTRAEPITHELIAL LESION (HGSIL), GRADE 3 CIN, ON BIOPSY OF CERVIX: ICD-10-CM

## 2021-11-10 PROCEDURE — 99213 OFFICE O/P EST LOW 20 MIN: CPT | Performed by: OBSTETRICS & GYNECOLOGY

## 2021-11-10 NOTE — PROGRESS NOTES
[facility]  [email or web address]  [phone]    Macho Lynch MD, FACOG       OB/GYN Problem visit    Chief Complaint:   Chief Complaint   Patient presents with    Other     6 month pap smea       Last or next WWE is: 2020    History of Present Illness: This is not a new problem being evaluated by this provider. The patient is a 39 y.o.  female who reports having uterine bleeding after her uterine embolization on 10/01/2021. She reports that she feels very frustrated because she keeps having expensive procedures that do nothing to help her bleeding. She reports that she does not want to take any medication or birth control for bleeding. She also is here today for her 6 month pap smear after Leep procedure on 21. She reports the symptoms are is unchanged. Aggravating factors include none. Alleviating factors include none. Not sure if she wants one more baby. +bleeding with SA. She does not have other concerns. LMP: No LMP recorded. Ultrasound:  TRANSVAGINAL ULTRASOUND PERFORMED  UTERUS IS ANTEVERTED, NORMAL IN SIZE AND HETEROGENEOUS IN ECHOGENICITY. THERE MAY BE FIBROIDS PRESENT BUT THEY ARE NOT WELL DEFINED.  ENDOMETRIUM MEASURES 8-9MM IN THICKNESS. NO EVIDENCE OF MASSES OR ABNORMALITIES ARE SEEN. RIGHT OVARY APPEARS WITHIN NORMAL LIMITS. LEFT OVARY APPEARS WITHIN NORMAL LIMITS. FREE FLUID SEEN IN THE CDS.     PFSH:  Past Medical History:   Diagnosis Date    Abnormal Pap smear 2019    HGSIL     1/15    Chlamydia     not since [employer] [facility] [employer] 10th grade    ANTHONY I (cervical intraepithelial neoplasia I) 2019    ANTHONY III (cervical intraepithelial neoplasia grade III) with severe dysplasia 2021    Encounter for insertion of mirena IUD 2/23/15    removal in 5 years   Paul Ruff Gonorrhea     last episode in [employer] [facility] 10 th grade age 12 tx received    Pap smear for cervical cancer screening 16    Neg,HPV Neg    Pap smear for cervical cancer screening 12/08/2020    ASCUS, HPV positive    Psychiatric problem     depression in highschool, currently seeing [facility] at [facility] 2/26/20     Past Surgical History:   Procedure Laterality Date    HX COLPOSCOPY  09/26/2019    ANTHONY I, benign    HX COLPOSCOPY  01/15/2021    [address], ectocx:CIN2    HX HERNIA REPAIR  10/02/2020    Rajeev Wise MD    HX LEEP PROCEDURE  03/17/2021    ANTHONY III    HX LIPECTOMY  12/31/2020    Dr. Rajeev Wise    HX OTHER SURGICAL  10/01/2021    [place]     IR OCCL TXCATH ORGAN W SI  10/4/2021     Family History   Problem Relation Age of Onset    Headache Mother     Migraines Mother     Hypertension Mother     Arthritis-osteo Maternal Aunt     Alcohol abuse Maternal Grandfather     Cancer Paternal Grandmother     Breast Cancer Paternal Grandmother         31-43    Asthma Paternal Grandfather      Social History     Tobacco Use    Smoking status: Former Smoker    Smokeless tobacco: Never Used   Substance Use Topics    Alcohol use: Yes     Alcohol/week: 1.0 standard drink     Types: 1 Standard drinks or equivalent per week    Drug use: No     Allergies   Allergen Reactions    Latex, Natural Rubber Swelling    Clindamycin Hives     No current outpatient medications on file. No current facility-administered medications for this visit.        Review of Systems:  History obtained from the patient  Constitutional: negative for fevers, chills and weight loss  ENT ROS: negative for - hearing change, oral lesions or visual changes  Respiratory: negative for cough, wheezing or dyspnea on exertion  Cardiovascular: negative for chest pain, irregular heart beats, exertional chest pressure/discomfort  Gastrointestinal: negative for dysphagia, nausea and vomiting  Genito-Urinary ROS:  see HPI  Inteument/breast: negative for rash, breast lump and nipple discharge  Musculoskeletal:negative for stiff joints, neck pain and muscle weakness  Endocrine ROS: negative for - breast changes, galactorrhea or temperature intolerance  Hematological and Lymphatic ROS: negative for - blood clots, bruising or swollen lymph nodes    Physical Exam:  Visit Vitals  BP (!) 143/90 (BP 1 Location: Right arm, BP Patient Position: Sitting, BP Cuff Size: Adult)   Pulse (!) 108   Wt 143 lb (64.9 kg)   BMI 26.16 kg/m²       GENERAL: alert, well appearing, and in no distress  HEAD: normocephalic, atraumatic. ABDOMEN: soft, nontender, nondistended, no masses or organomegaly   EGBUS: no lesions, no inflammation, no masses  VULVA: normal appearing vulva with no masses, tenderness or lesions  VAGINA: normal appearing vagina with normal color, no lesions, bloody tinged discharge  CERVIX: normal appearing cervix without discharge or lesions, non tender  ? Small myoma/polyp at os ~1.5cm, friable  UTERUS: uterus is normal size, shape, consistency and nontender   ADNEXA: normal adnexa in size, nontender and no masses  NEURO: alert, oriented, normal speech    Assessment:  Encounter Diagnoses   Name Primary?  Abnormal uterine bleeding Yes    Vaginal discharge     H/O LEEP     High grade squamous intraepithelial lesion (HGSIL), grade 3 ANTHONY, on biopsy of cervix     Myoma        Plan:  The patient is advised that she should contact the office if she does not note improvement or if symptoms recur  Recommend follow up with PCP for non-gynecologic complaints and chronic medical problems. She should contact our office with any questions or concerns  She could keep her routine annual exam appointment. Bleeding precautions  Disc management options: observe sp uae/hormonal options/definitive surgical options  Ho re depo and TLH. FU on repeat pap.   Disc option of oriahnn  Reviewed US imaging no definitive myoma but structure seen at os  Bleeding precautions, pt declines repeat CBC  Pt will consider options and notify MD vs preop consult for TLMARIA ISABEL    On this date, 11/10/2021,  I have spent 25 minutes reviewing previous notes, test results and face to face with the patient discussing the diagnosis and importance of compliance with the treatment plan as well as documenting on the day of the visit. Disc hysterectomy options using model. Physician review of ultrasound performed by technician    Today's ultrasound report and images were reviewed and discussed with the patient. Please see images and imaging report entered by technician in PACS for more detail and progress note and diagnosis entered by MD.    Emely Mendez MD      Orders Placed This Encounter    NUSWAB VAGINITIS PLUS    PAP IG, APTIMA HPV AND [address] 16/18,45 ([id number])       No results found for this visit on 11/10/21.

## 2021-11-13 LAB
A VAGINAE DNA VAG QL NAA+PROBE: ABNORMAL SCORE
BVAB2 DNA VAG QL NAA+PROBE: ABNORMAL SCORE
C ALBICANS DNA VAG QL NAA+PROBE: NEGATIVE
C GLABRATA DNA VAG QL NAA+PROBE: NEGATIVE
C TRACH DNA VAG QL NAA+PROBE: NEGATIVE
MEGA1 DNA VAG QL NAA+PROBE: ABNORMAL SCORE
N GONORRHOEA DNA VAG QL NAA+PROBE: NEGATIVE
T VAGINALIS DNA VAG QL NAA+PROBE: NEGATIVE

## 2021-11-16 RX ORDER — METRONIDAZOLE 500 MG/1
500 TABLET ORAL 2 TIMES DAILY
Qty: 14 TABLET | Refills: 0 | Status: SHIPPED | OUTPATIENT
Start: 2021-11-16 | End: 2021-11-23

## 2021-11-17 LAB
CYTOLOGIST CVX/VAG CYTO: NORMAL
CYTOLOGY CVX/VAG DOC CYTO: NORMAL
CYTOLOGY CVX/VAG DOC THIN PREP: NORMAL
CYTOLOGY HISTORY:: NORMAL
DX ICD CODE: NORMAL
HPV I/H RISK 4 DNA CVX QL PROBE+SIG AMP: NEGATIVE
Lab: NORMAL
Lab: NORMAL
OTHER STN SPEC: NORMAL
STAT OF ADQ CVX/VAG CYTO-IMP: NORMAL

## 2021-11-17 NOTE — PROGRESS NOTES
Abnormal,Consistent with BV   Notify pt if [facility] message not read or not active.   Rx:   flagyl 500mg bid   x 7 days    May cause nausea, take with food  Avoid etoh during treatment and 1 day following  Notify MD if NI after 1 week    (If patient prefers vaginal tx't  0.75 %t metronidazole vaginal gel   5 grams qhs per vagina  x5 days)

## 2021-11-18 NOTE — PROGRESS NOTES
Follow up call to Ms. Oanh Keene Pt verified self and birth date for privacy precautions. Patient was advised of results. Ms. Esme Noel acknowledged understanding and all questions were answered to patients satisfaction. No further questions or concerns at this time.      Updated [facility]

## 2021-11-18 NOTE — PROGRESS NOTES
Normal pap smear, message sent if [address] active. Update PMH/HM: include: Date of pap, Cytology: wnl. For HR HPV results: list NEG or POS, when done.

## 2022-03-19 PROBLEM — N63.10 BREAST MASS, RIGHT: Status: ACTIVE | Noted: 2018-04-10

## 2022-03-19 PROBLEM — N64.4 MASTODYNIA OF RIGHT BREAST: Status: ACTIVE | Noted: 2018-04-10

## 2022-03-19 PROBLEM — K40.90 NON-RECURRENT INGUINAL HERNIA OF LEFT SIDE WITHOUT OBSTRUCTION OR GANGRENE: Status: ACTIVE | Noted: 2020-08-26

## (undated) DEVICE — ELECTRODE BLDE L4IN NONINSULATED EDGE

## (undated) DEVICE — TUBING, SUCTION, 1/4" X 12', STRAIGHT: Brand: MEDLINE

## (undated) DEVICE — SYR 10ML LUER LOK 1/5ML GRAD --

## (undated) DEVICE — PACK,LITHOTOMY,PK I: Brand: MEDLINE

## (undated) DEVICE — LLETZ LOOP ELECTRODE, 20MM WIDE X 8MM DEEP, 11.8 CM SHAFT, TAN: Brand: MEGADYNE

## (undated) DEVICE — INFECTION CONTROL KIT SYS

## (undated) DEVICE — ROCKER SWITCH PENCIL BLADE ELECTRODE, HOLSTER: Brand: EDGE

## (undated) DEVICE — SUT SLK 2-0SH 30IN BLK --

## (undated) DEVICE — MARKER SURG SKIN UTIL REGULAR/FINE 2 TIP W/ RUL AND 9 LBL

## (undated) DEVICE — COVER LT HNDL PLAS RIG 1 PER PK

## (undated) DEVICE — GOWN,SIRUS,NONRNF,SETINSLV,XL,20/CS: Brand: MEDLINE

## (undated) DEVICE — PAD,SANITARY,11 IN,MAXI,N-STRL,IND WRAP: Brand: MEDLINE

## (undated) DEVICE — JELLY,LUBE,STERILE,FLIP TOP,TUBE,4-OZ: Brand: MEDLINE

## (undated) DEVICE — SHEET,DRAPE,UNDERBUTTOCK,GRAD POUCH,PORT: Brand: MEDLINE

## (undated) DEVICE — STERILE POLYISOPRENE POWDER-FREE SURGICAL GLOVES: Brand: PROTEXIS

## (undated) DEVICE — PAD,NON-ADHERENT,3X8,STERILE,LF,1/PK: Brand: MEDLINE

## (undated) DEVICE — CATHETER,FOLEY,100%SILICONE,16FR,10ML,LF: Brand: MEDLINE

## (undated) DEVICE — REM POLYHESIVE ADULT PATIENT RETURN ELECTRODE: Brand: VALLEYLAB

## (undated) DEVICE — SEAL HYSTEROSCOPE TRUCLEAR ELITE

## (undated) DEVICE — SOCK SPEC L9IN WHT UNIV W/ STD PRT FOR FLD MGMT

## (undated) DEVICE — TRAY PREP DRY W/ PREM GLV 2 APPL 6 SPNG 2 UNDPD 1 OVERWRAP

## (undated) DEVICE — MORCELLATOR HYSTEROSCOPIC 10MM CUT WIND 5MM DST FOR 80 SYS

## (undated) DEVICE — Device

## (undated) DEVICE — MARKER,SKIN,WI/RULER AND LABELS: Brand: MEDLINE

## (undated) DEVICE — STRAP,POSITIONING,KNEE/BODY,FOAM,4X60": Brand: MEDLINE

## (undated) DEVICE — NEEDLE SPNL 22GA L3.5IN BLK HUB S STL REG WALL FIT STYL W/

## (undated) DEVICE — SMOKE EVACUATION PENCIL: Brand: VALLEYLAB

## (undated) DEVICE — TOWEL SURG W17XL27IN STD BLU COT NONFENESTRATED PREWASHED

## (undated) DEVICE — CATHETER,URETHRAL,REDRUBBER,STRL,16FR: Brand: MEDLINE

## (undated) DEVICE — SPONGE GZ W4XL4IN COT RADPQ HIGHLY ABSRB

## (undated) DEVICE — ELECTRODE ES L11CM DIA5MM BALL SHFT RED DISP UTAHLOOP

## (undated) DEVICE — SOL IRR SOD CL 0.9% 3000ML --

## (undated) DEVICE — LLETZ LOOP ELECTRODE, 10MM WIDE X 10MM DEEP, 11.8 CM SHAFT, YELLOW: Brand: MEGADYNE

## (undated) DEVICE — HYSTEROSCOPIC PROCEDURE KIT: Brand: QUICKPICK